# Patient Record
Sex: FEMALE | Race: WHITE | Employment: FULL TIME | URBAN - METROPOLITAN AREA
[De-identification: names, ages, dates, MRNs, and addresses within clinical notes are randomized per-mention and may not be internally consistent; named-entity substitution may affect disease eponyms.]

---

## 2017-02-28 ENCOUNTER — HOSPITAL ENCOUNTER (OUTPATIENT)
Dept: SURGERY | Age: 52
Discharge: HOME | End: 2017-02-28
Payer: COMMERCIAL

## 2017-02-28 VITALS
BODY MASS INDEX: 41.41 KG/M2 | TEMPERATURE: 98.2 F | WEIGHT: 225 LBS | OXYGEN SATURATION: 96 % | RESPIRATION RATE: 16 BRPM | DIASTOLIC BLOOD PRESSURE: 79 MMHG | SYSTOLIC BLOOD PRESSURE: 167 MMHG | HEART RATE: 80 BPM | HEIGHT: 62 IN

## 2017-02-28 LAB
BACTERIA SPEC CULT: NORMAL
SERVICE CMNT-IMP: NORMAL

## 2017-02-28 PROCEDURE — 87641 MR-STAPH DNA AMP PROBE: CPT | Performed by: ORTHOPAEDIC SURGERY

## 2017-02-28 NOTE — PERIOP NOTES
Patient verified name, , and surgery as listed in [facility]. TYPE  CASE:3              Orders:not yet received. Chart marked for lab results to be obtained from Dr Dennis Mcgee. MRSA MSSA nasal swab obtained and sent to lab per verbal.    Labs per anesthesia protocol: type and screen DOS  EKG  :  16  Glucose: Not required    Pt takes no Rx medication. Instructed patient to continue previous medications as prescribed prior to surgery and  to take the following medications the day of surgery according to anesthesia guidelines with a small sip of water : none       Continue all previous medications unless otherwise directed. Instructed patient to hold  the following medications prior to surgery: aleve and Mobic    Patient instructed on the following and verbalized understanding:  Arrive at MAIN entrance, time of arrival to be called the day before by 1700. Responsible adult must drive patient to and from hospital, stay during surgery and 24 hours postoperatively. Npo after midnight including gum, mints and ice chips. Use hibiclens in the shower the night before and the morning of surgery. Leave all valuables at home. Instructed on no jewelry or body piercings on the dos. Bring insurance card and ID. No perfumes, oil, powder, colognes, makeup or  lotions on the skin. Patient verbalized understanding of all instructions and provided all medical/health information to the best of their ability.

## 2017-03-01 NOTE — PERIOP NOTES
Received faxed labs dated 2/28/17 by Dr. Shore Cuff office to include CBC, BMP, PT, PTT. Placed on chart.

## 2017-03-03 ENCOUNTER — ANESTHESIA EVENT (OUTPATIENT)
Dept: SURGERY | Age: 52
DRG: 470 | End: 2017-03-03
Payer: COMMERCIAL

## 2017-03-05 PROBLEM — K02.9 DENTAL CARIES: Chronic | Status: ACTIVE | Noted: 2017-03-05

## 2017-03-05 PROBLEM — M17.11 PRIMARY OSTEOARTHRITIS OF RIGHT KNEE: Status: ACTIVE | Noted: 2017-03-05

## 2017-03-05 NOTE — ANESTHESIA PREPROCEDURE EVALUATION
Anesthetic History   No history of anesthetic complications            Review of Systems / Medical History  Patient summary reviewed and pertinent labs reviewed    Pulmonary  Within defined limits                 Neuro/Psych   Within defined limits           Cardiovascular  Within defined limits                Exercise tolerance: >4 METS     GI/Hepatic/Renal  Within defined limits              Endo/Other        Morbid obesity and arthritis     Other Findings            Physical Exam    Airway  Mallampati: II  TM Distance: 4 - 6 cm  Neck ROM: normal range of motion   Mouth opening: Normal     Cardiovascular  Regular rate and rhythm,  S1 and S2 normal,  no murmur, click, rub, or gallop             Dental    Dentition: Edentulous     Pulmonary  Breath sounds clear to auscultation               Abdominal  GI exam deferred       Other Findings            Anesthetic Plan    ASA: 3  Anesthesia type: spinal      Post-op pain plan if not by surgeon: peripheral nerve block single      Anesthetic plan and risks discussed with: Patient and Family

## 2017-03-06 ENCOUNTER — HOSPITAL ENCOUNTER (INPATIENT)
Age: 52
LOS: 1 days | Discharge: HOME | DRG: 470 | End: 2017-03-07
Attending: ORTHOPAEDIC SURGERY | Admitting: ORTHOPAEDIC SURGERY
Payer: COMMERCIAL

## 2017-03-06 ENCOUNTER — ANESTHESIA (OUTPATIENT)
Dept: SURGERY | Age: 52
DRG: 470 | End: 2017-03-06
Payer: COMMERCIAL

## 2017-03-06 PROBLEM — M17.11 RIGHT KNEE DJD: Status: ACTIVE | Noted: 2017-03-06

## 2017-03-06 LAB
ABO + RH BLD: NORMAL
ANION GAP BLD CALC-SCNC: 12 MMOL/L (ref 7–16)
APPEARANCE UR: ABNORMAL
APTT PPP: 21 SEC (ref 23.5–31.7)
ATRIAL RATE: 90 BPM
BACTERIA URNS QL MICRO: 0 /HPF
BASOPHILS # BLD AUTO: 0 K/UL (ref 0–0.2)
BASOPHILS # BLD: 0 % (ref 0–2)
BILIRUB UR QL: NEGATIVE
BLOOD GROUP ANTIBODIES SERPL: NORMAL
BUN SERPL-MCNC: 10 MG/DL (ref 6–23)
CALCIUM SERPL-MCNC: 8.5 MG/DL (ref 8.3–10.4)
CALCULATED P AXIS, ECG09: 38 DEGREES
CALCULATED R AXIS, ECG10: 21 DEGREES
CALCULATED T AXIS, ECG11: 50 DEGREES
CASTS URNS QL MICRO: ABNORMAL /LPF
CHLORIDE SERPL-SCNC: 105 MMOL/L (ref 98–107)
CO2 SERPL-SCNC: 23 MMOL/L (ref 21–32)
COLOR UR: YELLOW
CREAT SERPL-MCNC: 0.97 MG/DL (ref 0.6–1)
DIAGNOSIS, 93000: NORMAL
DIASTOLIC BP, ECG02: NORMAL MMHG
DIFFERENTIAL METHOD BLD: ABNORMAL
EOSINOPHIL # BLD: 0 K/UL (ref 0–0.8)
EOSINOPHIL NFR BLD: 0 % (ref 0.5–7.8)
EPI CELLS #/AREA URNS HPF: ABNORMAL /HPF
ERYTHROCYTE [DISTWIDTH] IN BLOOD BY AUTOMATED COUNT: 14.9 % (ref 11.9–14.6)
GLUCOSE SERPL-MCNC: 147 MG/DL (ref 65–100)
GLUCOSE UR STRIP.AUTO-MCNC: NEGATIVE MG/DL
HCG UR QL: NEGATIVE
HCT VFR BLD AUTO: 35.8 % (ref 35.8–46.3)
HGB BLD-MCNC: 11.3 G/DL (ref 11.7–15.4)
HGB UR QL STRIP: NEGATIVE
IMM GRANULOCYTES # BLD: 0 K/UL (ref 0–0.5)
IMM GRANULOCYTES NFR BLD AUTO: 0.1 % (ref 0–5)
INR PPP: 1 (ref 0.9–1.2)
KETONES UR QL STRIP.AUTO: NEGATIVE MG/DL
LEUKOCYTE ESTERASE UR QL STRIP.AUTO: ABNORMAL
LYMPHOCYTES # BLD AUTO: 6 % (ref 13–44)
LYMPHOCYTES # BLD: 0.5 K/UL (ref 0.5–4.6)
MCH RBC QN AUTO: 25.3 PG (ref 26.1–32.9)
MCHC RBC AUTO-ENTMCNC: 31.6 G/DL (ref 31.4–35)
MCV RBC AUTO: 80.1 FL (ref 79.6–97.8)
MONOCYTES # BLD: 0 K/UL (ref 0.1–1.3)
MONOCYTES NFR BLD AUTO: 0 % (ref 4–12)
NEUTS SEG # BLD: 8.1 K/UL (ref 1.7–8.2)
NEUTS SEG NFR BLD AUTO: 94 % (ref 43–78)
NITRITE UR QL STRIP.AUTO: NEGATIVE
P-R INTERVAL, ECG05: 152 MS
PH UR STRIP: 6 [PH] (ref 5–9)
PLATELET # BLD AUTO: 287 K/UL (ref 150–450)
PMV BLD AUTO: 9.8 FL (ref 10.8–14.1)
POTASSIUM SERPL-SCNC: 3.9 MMOL/L (ref 3.5–5.1)
PROT UR STRIP-MCNC: NEGATIVE MG/DL
PROTHROMBIN TIME: 10.5 SEC (ref 9.6–12)
Q-T INTERVAL, ECG07: 368 MS
QRS DURATION, ECG06: 88 MS
QTC CALCULATION (BEZET), ECG08: 450 MS
RBC # BLD AUTO: 4.47 M/UL (ref 4.05–5.25)
RBC #/AREA URNS HPF: ABNORMAL /HPF
SODIUM SERPL-SCNC: 140 MMOL/L (ref 136–145)
SP GR UR REFRACTOMETRY: 1.01 (ref 1–1.02)
SPECIMEN EXP DATE BLD: NORMAL
SYSTOLIC BP, ECG01: NORMAL MMHG
UROBILINOGEN UR QL STRIP.AUTO: 0.2 EU/DL (ref 0.2–1)
VENTRICULAR RATE, ECG03: 90 BPM
WBC # BLD AUTO: 8.6 K/UL (ref 4.3–11.1)
WBC URNS QL MICRO: ABNORMAL /HPF

## 2017-03-06 PROCEDURE — 76060000035 HC ANESTHESIA 2 TO 2.5 HR: Performed by: ORTHOPAEDIC SURGERY

## 2017-03-06 PROCEDURE — 80048 BASIC METABOLIC PNL TOTAL CA: CPT | Performed by: ORTHOPAEDIC SURGERY

## 2017-03-06 PROCEDURE — 81025 URINE PREGNANCY TEST: CPT

## 2017-03-06 PROCEDURE — 74011000258 HC RX REV CODE- 258: Performed by: ORTHOPAEDIC SURGERY

## 2017-03-06 PROCEDURE — 74011250636 HC RX REV CODE- 250/636: Performed by: ORTHOPAEDIC SURGERY

## 2017-03-06 PROCEDURE — 76010000171 HC OR TIME 2 TO 2.5 HR INTENSV-TIER 1: Performed by: ORTHOPAEDIC SURGERY

## 2017-03-06 PROCEDURE — 74011000250 HC RX REV CODE- 250: Performed by: ORTHOPAEDIC SURGERY

## 2017-03-06 PROCEDURE — 97161 PT EVAL LOW COMPLEX 20 MIN: CPT

## 2017-03-06 PROCEDURE — 77030006835 HC BLD SAW SAG STRY -B: Performed by: ORTHOPAEDIC SURGERY

## 2017-03-06 PROCEDURE — 85610 PROTHROMBIN TIME: CPT | Performed by: ORTHOPAEDIC SURGERY

## 2017-03-06 PROCEDURE — 77030016547 HC BLD SAW SAG1 STRY -B: Performed by: ORTHOPAEDIC SURGERY

## 2017-03-06 PROCEDURE — 74011250637 HC RX REV CODE- 250/637: Performed by: ANESTHESIOLOGY

## 2017-03-06 PROCEDURE — 77030003665 HC NDL SPN BBMI -A: Performed by: ANESTHESIOLOGY

## 2017-03-06 PROCEDURE — 36415 COLL VENOUS BLD VENIPUNCTURE: CPT | Performed by: ORTHOPAEDIC SURGERY

## 2017-03-06 PROCEDURE — C1713 ANCHOR/SCREW BN/BN,TIS/BN: HCPCS | Performed by: ORTHOPAEDIC SURGERY

## 2017-03-06 PROCEDURE — 74011250637 HC RX REV CODE- 250/637: Performed by: ORTHOPAEDIC SURGERY

## 2017-03-06 PROCEDURE — C1776 JOINT DEVICE (IMPLANTABLE): HCPCS | Performed by: ORTHOPAEDIC SURGERY

## 2017-03-06 PROCEDURE — 81001 URINALYSIS AUTO W/SCOPE: CPT | Performed by: ORTHOPAEDIC SURGERY

## 2017-03-06 PROCEDURE — 85730 THROMBOPLASTIN TIME PARTIAL: CPT | Performed by: ORTHOPAEDIC SURGERY

## 2017-03-06 PROCEDURE — 77030013727 HC IRR FAN PULSVC ZIMM -B: Performed by: ORTHOPAEDIC SURGERY

## 2017-03-06 PROCEDURE — 74011250636 HC RX REV CODE- 250/636

## 2017-03-06 PROCEDURE — 76010010054 HC POST OP PAIN BLOCK: Performed by: ORTHOPAEDIC SURGERY

## 2017-03-06 PROCEDURE — 77030007880 HC KT SPN EPDRL BBMI -B: Performed by: ANESTHESIOLOGY

## 2017-03-06 PROCEDURE — 77030008467 HC STPLR SKN COVD -B: Performed by: ORTHOPAEDIC SURGERY

## 2017-03-06 PROCEDURE — 77030031139 HC SUT VCRL2 J&J -A: Performed by: ORTHOPAEDIC SURGERY

## 2017-03-06 PROCEDURE — 77030003666 HC NDL SPINAL BD -A: Performed by: ORTHOPAEDIC SURGERY

## 2017-03-06 PROCEDURE — 76942 ECHO GUIDE FOR BIOPSY: CPT | Performed by: ORTHOPAEDIC SURGERY

## 2017-03-06 PROCEDURE — 77030006812 HC BLD SAW RECIP STRY -B: Performed by: ORTHOPAEDIC SURGERY

## 2017-03-06 PROCEDURE — 93005 ELECTROCARDIOGRAM TRACING: CPT | Performed by: ORTHOPAEDIC SURGERY

## 2017-03-06 PROCEDURE — 77030020753 HC CUF TRNQT 1BLA STRY -B: Performed by: ORTHOPAEDIC SURGERY

## 2017-03-06 PROCEDURE — 77030012894: Performed by: ORTHOPAEDIC SURGERY

## 2017-03-06 PROCEDURE — 86900 BLOOD TYPING SEROLOGIC ABO: CPT | Performed by: ANESTHESIOLOGY

## 2017-03-06 PROCEDURE — 77030020782 HC GWN BAIR PAWS FLX 3M -B: Performed by: ANESTHESIOLOGY

## 2017-03-06 PROCEDURE — 77030003602 HC NDL NRV BLK BBMI -B: Performed by: ANESTHESIOLOGY

## 2017-03-06 PROCEDURE — 74011250636 HC RX REV CODE- 250/636: Performed by: ANESTHESIOLOGY

## 2017-03-06 PROCEDURE — 77030011640 HC PAD GRND REM COVD -A: Performed by: ORTHOPAEDIC SURGERY

## 2017-03-06 PROCEDURE — 85025 COMPLETE CBC W/AUTO DIFF WBC: CPT | Performed by: ORTHOPAEDIC SURGERY

## 2017-03-06 PROCEDURE — 77030018836 HC SOL IRR NACL ICUM -A: Performed by: ORTHOPAEDIC SURGERY

## 2017-03-06 PROCEDURE — 0SRC0J9 REPLACEMENT OF RIGHT KNEE JOINT WITH SYNTHETIC SUBSTITUTE, CEMENTED, OPEN APPROACH: ICD-10-PCS | Performed by: ORTHOPAEDIC SURGERY

## 2017-03-06 PROCEDURE — 74011000250 HC RX REV CODE- 250

## 2017-03-06 PROCEDURE — 76210000006 HC OR PH I REC 0.5 TO 1 HR: Performed by: ORTHOPAEDIC SURGERY

## 2017-03-06 PROCEDURE — 65270000029 HC RM PRIVATE

## 2017-03-06 DEVICE — IMPLANTABLE DEVICE: Type: IMPLANTABLE DEVICE | Site: KNEE | Status: FUNCTIONAL

## 2017-03-06 DEVICE — (D)CEMENT BNE HV R 40GM -- DUPE USE ITEM 353850: Type: IMPLANTABLE DEVICE | Site: KNEE | Status: FUNCTIONAL

## 2017-03-06 DEVICE — COMPNT FEM TWIN PEG MED --: Type: IMPLANTABLE DEVICE | Site: KNEE | Status: FUNCTIONAL

## 2017-03-06 RX ORDER — NALOXONE HYDROCHLORIDE 0.4 MG/ML
.2-.4 INJECTION, SOLUTION INTRAMUSCULAR; INTRAVENOUS; SUBCUTANEOUS
Status: DISCONTINUED | OUTPATIENT
Start: 2017-03-06 | End: 2017-03-07 | Stop reason: HOSPADM

## 2017-03-06 RX ORDER — AMOXICILLIN 250 MG
2 CAPSULE ORAL DAILY
Status: DISCONTINUED | OUTPATIENT
Start: 2017-03-07 | End: 2017-03-07 | Stop reason: HOSPADM

## 2017-03-06 RX ORDER — DIPHENHYDRAMINE HCL 25 MG
25 CAPSULE ORAL
Status: DISCONTINUED | OUTPATIENT
Start: 2017-03-06 | End: 2017-03-07 | Stop reason: HOSPADM

## 2017-03-06 RX ORDER — FENTANYL CITRATE 50 UG/ML
100 INJECTION, SOLUTION INTRAMUSCULAR; INTRAVENOUS ONCE
Status: COMPLETED | OUTPATIENT
Start: 2017-03-06 | End: 2017-03-06

## 2017-03-06 RX ORDER — HYDROCODONE BITARTRATE AND ACETAMINOPHEN 5; 325 MG/1; MG/1
2 TABLET ORAL AS NEEDED
Status: DISCONTINUED | OUTPATIENT
Start: 2017-03-06 | End: 2017-03-06 | Stop reason: HOSPADM

## 2017-03-06 RX ORDER — KETOROLAC TROMETHAMINE 30 MG/ML
INJECTION, SOLUTION INTRAMUSCULAR; INTRAVENOUS AS NEEDED
Status: DISCONTINUED | OUTPATIENT
Start: 2017-03-06 | End: 2017-03-06 | Stop reason: HOSPADM

## 2017-03-06 RX ORDER — SODIUM CHLORIDE 9 MG/ML
100 INJECTION, SOLUTION INTRAVENOUS CONTINUOUS
Status: DISCONTINUED | OUTPATIENT
Start: 2017-03-06 | End: 2017-03-07 | Stop reason: HOSPADM

## 2017-03-06 RX ORDER — SODIUM CHLORIDE, SODIUM LACTATE, POTASSIUM CHLORIDE, CALCIUM CHLORIDE 600; 310; 30; 20 MG/100ML; MG/100ML; MG/100ML; MG/100ML
75 INJECTION, SOLUTION INTRAVENOUS CONTINUOUS
Status: DISCONTINUED | OUTPATIENT
Start: 2017-03-06 | End: 2017-03-06 | Stop reason: HOSPADM

## 2017-03-06 RX ORDER — MIDAZOLAM HYDROCHLORIDE 1 MG/ML
2 INJECTION, SOLUTION INTRAMUSCULAR; INTRAVENOUS
Status: DISCONTINUED | OUTPATIENT
Start: 2017-03-06 | End: 2017-03-06 | Stop reason: HOSPADM

## 2017-03-06 RX ORDER — HYDROMORPHONE HYDROCHLORIDE 2 MG/ML
0.5 INJECTION, SOLUTION INTRAMUSCULAR; INTRAVENOUS; SUBCUTANEOUS
Status: DISCONTINUED | OUTPATIENT
Start: 2017-03-06 | End: 2017-03-06 | Stop reason: HOSPADM

## 2017-03-06 RX ORDER — FENTANYL CITRATE 50 UG/ML
INJECTION, SOLUTION INTRAMUSCULAR; INTRAVENOUS AS NEEDED
Status: DISCONTINUED | OUTPATIENT
Start: 2017-03-06 | End: 2017-03-06 | Stop reason: HOSPADM

## 2017-03-06 RX ORDER — ENOXAPARIN SODIUM 100 MG/ML
40 INJECTION SUBCUTANEOUS EVERY 24 HOURS
Status: DISCONTINUED | OUTPATIENT
Start: 2017-03-07 | End: 2017-03-07 | Stop reason: HOSPADM

## 2017-03-06 RX ORDER — DEXAMETHASONE SODIUM PHOSPHATE 4 MG/ML
INJECTION, SOLUTION INTRA-ARTICULAR; INTRALESIONAL; INTRAMUSCULAR; INTRAVENOUS; SOFT TISSUE AS NEEDED
Status: DISCONTINUED | OUTPATIENT
Start: 2017-03-06 | End: 2017-03-06 | Stop reason: HOSPADM

## 2017-03-06 RX ORDER — LIDOCAINE HYDROCHLORIDE 10 MG/ML
0.1 INJECTION INFILTRATION; PERINEURAL AS NEEDED
Status: DISCONTINUED | OUTPATIENT
Start: 2017-03-06 | End: 2017-03-06 | Stop reason: HOSPADM

## 2017-03-06 RX ORDER — CEFAZOLIN SODIUM IN 0.9 % NACL 2 G/50 ML
2 INTRAVENOUS SOLUTION, PIGGYBACK (ML) INTRAVENOUS EVERY 8 HOURS
Status: COMPLETED | OUTPATIENT
Start: 2017-03-06 | End: 2017-03-07

## 2017-03-06 RX ORDER — CEFAZOLIN SODIUM IN 0.9 % NACL 2 G/50 ML
2 INTRAVENOUS SOLUTION, PIGGYBACK (ML) INTRAVENOUS ONCE
Status: COMPLETED | OUTPATIENT
Start: 2017-03-06 | End: 2017-03-06

## 2017-03-06 RX ORDER — MIDAZOLAM HYDROCHLORIDE 1 MG/ML
INJECTION, SOLUTION INTRAMUSCULAR; INTRAVENOUS AS NEEDED
Status: DISCONTINUED | OUTPATIENT
Start: 2017-03-06 | End: 2017-03-06 | Stop reason: HOSPADM

## 2017-03-06 RX ORDER — ASPIRIN 325 MG
325 TABLET, DELAYED RELEASE (ENTERIC COATED) ORAL EVERY 12 HOURS
Status: DISCONTINUED | OUTPATIENT
Start: 2017-03-06 | End: 2017-03-07 | Stop reason: HOSPADM

## 2017-03-06 RX ORDER — HYDROCODONE BITARTRATE AND ACETAMINOPHEN 5; 325 MG/1; MG/1
1 TABLET ORAL
Status: DISCONTINUED | OUTPATIENT
Start: 2017-03-06 | End: 2017-03-07 | Stop reason: HOSPADM

## 2017-03-06 RX ORDER — DEXAMETHASONE SODIUM PHOSPHATE 100 MG/10ML
10 INJECTION INTRAMUSCULAR; INTRAVENOUS ONCE
Status: DISCONTINUED | OUTPATIENT
Start: 2017-03-07 | End: 2017-03-07 | Stop reason: HOSPADM

## 2017-03-06 RX ORDER — SODIUM CHLORIDE 0.9 % (FLUSH) 0.9 %
5-10 SYRINGE (ML) INJECTION EVERY 8 HOURS
Status: DISCONTINUED | OUTPATIENT
Start: 2017-03-06 | End: 2017-03-07 | Stop reason: HOSPADM

## 2017-03-06 RX ORDER — BUPIVACAINE HYDROCHLORIDE AND EPINEPHRINE 5; 5 MG/ML; UG/ML
INJECTION, SOLUTION EPIDURAL; INTRACAUDAL; PERINEURAL AS NEEDED
Status: DISCONTINUED | OUTPATIENT
Start: 2017-03-06 | End: 2017-03-06 | Stop reason: HOSPADM

## 2017-03-06 RX ORDER — CELECOXIB 200 MG/1
200 CAPSULE ORAL EVERY 12 HOURS
Status: DISCONTINUED | OUTPATIENT
Start: 2017-03-06 | End: 2017-03-07 | Stop reason: HOSPADM

## 2017-03-06 RX ORDER — HYDROMORPHONE HYDROCHLORIDE 1 MG/ML
1 INJECTION, SOLUTION INTRAMUSCULAR; INTRAVENOUS; SUBCUTANEOUS
Status: DISCONTINUED | OUTPATIENT
Start: 2017-03-06 | End: 2017-03-07 | Stop reason: HOSPADM

## 2017-03-06 RX ORDER — OXYCODONE AND ACETAMINOPHEN 7.5; 325 MG/1; MG/1
1 TABLET ORAL
Status: DISCONTINUED | OUTPATIENT
Start: 2017-03-06 | End: 2017-03-07 | Stop reason: HOSPADM

## 2017-03-06 RX ORDER — FAMOTIDINE 20 MG/1
20 TABLET, FILM COATED ORAL ONCE
Status: COMPLETED | OUTPATIENT
Start: 2017-03-06 | End: 2017-03-06

## 2017-03-06 RX ORDER — ONDANSETRON 2 MG/ML
INJECTION INTRAMUSCULAR; INTRAVENOUS AS NEEDED
Status: DISCONTINUED | OUTPATIENT
Start: 2017-03-06 | End: 2017-03-06 | Stop reason: HOSPADM

## 2017-03-06 RX ORDER — MIDAZOLAM HYDROCHLORIDE 1 MG/ML
5 INJECTION, SOLUTION INTRAMUSCULAR; INTRAVENOUS ONCE
Status: COMPLETED | OUTPATIENT
Start: 2017-03-06 | End: 2017-03-06

## 2017-03-06 RX ORDER — OXYCODONE HYDROCHLORIDE 5 MG/1
5 TABLET ORAL
Status: DISCONTINUED | OUTPATIENT
Start: 2017-03-06 | End: 2017-03-06 | Stop reason: HOSPADM

## 2017-03-06 RX ORDER — MORPHINE SULFATE 10 MG/ML
INJECTION, SOLUTION INTRAMUSCULAR; INTRAVENOUS AS NEEDED
Status: DISCONTINUED | OUTPATIENT
Start: 2017-03-06 | End: 2017-03-06 | Stop reason: HOSPADM

## 2017-03-06 RX ORDER — SODIUM CHLORIDE 0.9 % (FLUSH) 0.9 %
5-10 SYRINGE (ML) INJECTION AS NEEDED
Status: DISCONTINUED | OUTPATIENT
Start: 2017-03-06 | End: 2017-03-07 | Stop reason: HOSPADM

## 2017-03-06 RX ORDER — PROPOFOL 10 MG/ML
INJECTION, EMULSION INTRAVENOUS
Status: DISCONTINUED | OUTPATIENT
Start: 2017-03-06 | End: 2017-03-06 | Stop reason: HOSPADM

## 2017-03-06 RX ORDER — BUPIVACAINE HYDROCHLORIDE 7.5 MG/ML
INJECTION, SOLUTION INTRASPINAL AS NEEDED
Status: DISCONTINUED | OUTPATIENT
Start: 2017-03-06 | End: 2017-03-06 | Stop reason: HOSPADM

## 2017-03-06 RX ADMIN — SODIUM CHLORIDE 100 ML/HR: 900 INJECTION, SOLUTION INTRAVENOUS at 11:18

## 2017-03-06 RX ADMIN — PROPOFOL 160 MCG/KG/MIN: 10 INJECTION, EMULSION INTRAVENOUS at 07:50

## 2017-03-06 RX ADMIN — HYDROMORPHONE HYDROCHLORIDE 1 MG: 1 INJECTION, SOLUTION INTRAMUSCULAR; INTRAVENOUS; SUBCUTANEOUS at 12:27

## 2017-03-06 RX ADMIN — FENTANYL CITRATE 50 MCG: 50 INJECTION, SOLUTION INTRAMUSCULAR; INTRAVENOUS at 06:55

## 2017-03-06 RX ADMIN — FENTANYL CITRATE 50 MCG: 50 INJECTION, SOLUTION INTRAMUSCULAR; INTRAVENOUS at 07:42

## 2017-03-06 RX ADMIN — SODIUM CHLORIDE, SODIUM LACTATE, POTASSIUM CHLORIDE, AND CALCIUM CHLORIDE 75 ML/HR: 600; 310; 30; 20 INJECTION, SOLUTION INTRAVENOUS at 06:28

## 2017-03-06 RX ADMIN — DEXAMETHASONE SODIUM PHOSPHATE 4 MG: 4 INJECTION, SOLUTION INTRA-ARTICULAR; INTRALESIONAL; INTRAMUSCULAR; INTRAVENOUS; SOFT TISSUE at 08:29

## 2017-03-06 RX ADMIN — ASPIRIN 325 MG: 325 TABLET, DELAYED RELEASE ORAL at 20:44

## 2017-03-06 RX ADMIN — OXYCODONE HYDROCHLORIDE AND ACETAMINOPHEN 1 TABLET: 7.5; 325 TABLET ORAL at 11:18

## 2017-03-06 RX ADMIN — MIDAZOLAM HYDROCHLORIDE 2 MG: 1 INJECTION, SOLUTION INTRAMUSCULAR; INTRAVENOUS at 07:41

## 2017-03-06 RX ADMIN — FAMOTIDINE 20 MG: 20 TABLET ORAL at 06:29

## 2017-03-06 RX ADMIN — BUPIVACAINE HYDROCHLORIDE 1.7 ML: 7.5 INJECTION, SOLUTION INTRASPINAL at 07:47

## 2017-03-06 RX ADMIN — CELECOXIB 200 MG: 200 CAPSULE ORAL at 11:18

## 2017-03-06 RX ADMIN — OXYCODONE HYDROCHLORIDE AND ACETAMINOPHEN 1 TABLET: 7.5; 325 TABLET ORAL at 23:11

## 2017-03-06 RX ADMIN — TRANEXAMIC ACID 1 G: 100 INJECTION, SOLUTION INTRAVENOUS at 08:05

## 2017-03-06 RX ADMIN — ONDANSETRON 4 MG: 2 INJECTION INTRAMUSCULAR; INTRAVENOUS at 08:30

## 2017-03-06 RX ADMIN — MIDAZOLAM 3 MG: 1 INJECTION INTRAMUSCULAR; INTRAVENOUS at 06:55

## 2017-03-06 RX ADMIN — FENTANYL CITRATE 50 MCG: 50 INJECTION, SOLUTION INTRAMUSCULAR; INTRAVENOUS at 08:50

## 2017-03-06 RX ADMIN — CEFAZOLIN 2 G: 1 INJECTION, POWDER, FOR SOLUTION INTRAMUSCULAR; INTRAVENOUS; PARENTERAL at 16:00

## 2017-03-06 RX ADMIN — OXYCODONE HYDROCHLORIDE AND ACETAMINOPHEN 1 TABLET: 7.5; 325 TABLET ORAL at 16:00

## 2017-03-06 RX ADMIN — HYDROMORPHONE HYDROCHLORIDE 1 MG: 1 INJECTION, SOLUTION INTRAMUSCULAR; INTRAVENOUS; SUBCUTANEOUS at 19:18

## 2017-03-06 RX ADMIN — CEFAZOLIN 2 G: 1 INJECTION, POWDER, FOR SOLUTION INTRAMUSCULAR; INTRAVENOUS; PARENTERAL at 07:47

## 2017-03-06 RX ADMIN — CELECOXIB 200 MG: 200 CAPSULE ORAL at 23:11

## 2017-03-06 NOTE — PROGRESS NOTES
Problem: Mobility Impaired (Adult and Pediatric)  Goal: *Acute Goals and Plan of Care (Insert Text)  LTG:  (1.)Ms. Lucas will move from supine to sit and sit to supine , scoot up and down and roll side to side in bed with INDEPENDENCE within 5 day(s). (2.)Ms. Lucas will transfer from bed to chair and chair to bed with MODIFIED INDEPENDENCE using the least restrictive device within 5 day(s). (3.)Ms. Lucas will ambulate with MODIFIED INDEPENDENCE for 150+ feet with the least restrictive device within 5 day(s). (4.)Ms. Lucas will participate in therapeutic activity/exerices x 20 for increased strength within 5 days. (5.)Ms. Lucas will have 0 degrees R knee extension PROM to increase functional mobility and normalize gait pattern within 5 days. (6.)Ms. Lucas will have 110 degrees R knee flexion AROM to increase functional mobility and transfers within 5 days. ________________________________________________________________________________________________      PHYSICAL THERAPY: INITIAL ASSESSMENT, PM 3/6/2017  INPATIENT: Hospital Day: 1  Payor: "[employer]" / [place]UE ESSENTIALS COLLIN / Product Type: COLLIN /    R LE WBAT     NAME/AGE/GENDER: Ahsan Yoon is a 46 y.o. female             PRIMARY DIAGNOSIS: Primary osteoarthritis of right knee [M17.11] Primary osteoarthritis of right knee Primary osteoarthritis of right knee  Procedure(s) (LRB):  PARTIAL OXFORD  (Right)  Day of Surgery  ICD-10: Treatment Diagnosis:       · Generalized Muscle Weakness (M62.81)  · Other abnormalities of gait and mobility (R26.89)   Precaution/Allergies:  Pcn [penicillins]       ASSESSMENT:      Ms. Jane Lemons is a 46 y.o. female s/p above performed by Dr. Dangelo Lawson who is WBAT on R LE. Pt presents to PT with generalized weakness and decreased ROM in R knee. Ms. Jane Lemons reoprts intact sensation to light touch in B L5-S2 dermatomes. Pt was able to perform bed mobility with supervision and has good sitting balance.   Pt was instructed on transfer training with RW and was able to perform STS with CGA. Pt observed to have at least 90 degrees R knee flexion AROM but is lack full R knee extension. Pt has good to fair standing balance and was able to ambulate with CGA/RW and emerging reciprocal gait. She has decreased R weight bearing and L step length during gait. Ms. Tray Jernigan transferred to toilet and bedside chair with CGA. Pt instructed on R knee therapeutic exercise and provided HEP for strengthening and ROM. Pt left reclined in bedside chair with pillow under ankle and ice pack on anterior R knee to promote terminal knee extension. Pt could benefit from skilled PT to address above deficits and help pt return to baseline. This section established at most recent assessment   PROBLEM LIST (Impairments causing functional limitations):  1. Decreased Strength  2. Decreased Transfer Abilities  3. Decreased Ambulation Ability/Technique  4. Decreased Balance  5. Decreased Flexibility/Joint Mobility    INTERVENTIONS PLANNED: (Benefits and precautions of physical therapy have been discussed with the patient.)  1. Balance Exercise  2. Bed Mobility  3. Family Education  4. Gait Training  5. Neuromuscular Re-education/Strengthening  6. Range of Motion (ROM)  7. Therapeutic Activites  8. Therapeutic Exercise/Strengthening  9. Transfer Training  10. Group Therapy      TREATMENT PLAN: Frequency/Duration: twice daily for duration of hospital stay  Rehabilitation Potential For Stated Goals: GOOD      RECOMMENDED REHABILITATION/EQUIPMENT: (at time of discharge pending progress): Continue Skilled Therapy. HISTORY:   History of Present Injury/Illness (Reason for Referral):  S/P PARTIAL OXFORD  (Right)  Past Medical History/Comorbidities:   Ms. Tray Jernigan  has a past medical history of Arthritis; Diverticulitis; Morbid obesity ([address] and Right knee pain.   Ms. Tray Jernigan  has a past surgical history that includes abdomen surgery proc unlisted () and  section (). Social History/Living Environment:   Home Environment: Trailer/mobile home  # Steps to Enter: 1  One/Two Story Residence: One story  Living Alone: No  Support Systems: Spouse/Significant Other/Partner  Patient Expects to be Discharged to[de-identified] Unknown  Current DME Used/Available at Home: Walker, rolling, Shower chair, Commode, bedside, [address], [address], quad, Cane, straight  Tub or Shower Type: Tub/Shower combination  Prior Level of Function/Work/Activity:  Pt reports she lives in a mobile home with her spouse that has one step to enter. She reports being independent with ADLs and ambulation prior to surgery with no recent falls. Number of Personal Factors/Comorbidities that affect the Plan of Care:  · Obesity 1-2: MODERATE COMPLEXITY   EXAMINATION:   Most Recent Physical Functioning:   Gross Assessment:  AROM: Generally decreased, functional (R knee)  Strength: Generally decreased, functional (R knee)  Sensation: Intact               Posture:     Balance:  Sitting: Intact  Standing: Impaired  Standing - Static: Good  Standing - Dynamic : Fair Bed Mobility:  Supine to Sit: Supervision  Scooting: Supervision  Wheelchair Mobility:     Transfers:  Sit to Stand: Contact guard assistance  Stand to Sit: Contact guard assistance  Bed to Chair: Contact guard assistance  Interventions: Safety awareness training; Tactile cues; Verbal cues; Visual cues  Gait:     Base of Support: Shift to left  Speed/Elida: Slow  Step Length: Left shortened  Stance: Right decreased  Gait Abnormalities: Antalgic  Distance (ft): 5 Feet (ft)  Assistive Device: Walker, rolling  Ambulation - Level of Assistance: Contact guard assistance       Body Structures Involved:  1. Bones  2. Joints  3. Muscles Body Functions Affected:  1. Sensory/Pain  2. Neuromusculoskeletal  3. Movement Related Activities and Participation Affected:  1. General Tasks and Demands  2. Mobility  3.  Community, Social and [place]   Number of elements that affect the Plan of Care: 4+: HIGH COMPLEXITY   CLINICAL PRESENTATION:   Presentation: Stable and uncomplicated: LOW COMPLEXITY   CLINICAL DECISION MAKIN [facility] [address] [address] AM-PAC 6 Clicks   Basic Mobility Inpatient Short Form  How much difficulty does the patient currently have. .. Unable A Lot A Little None   1. Turning over in bed (including adjusting bedclothes, sheets and blankets)? [ ] 1   [ ] 2   [ ] 3   [X] 4   2. Sitting down on and standing up from a chair with arms ( e.g., wheelchair, bedside commode, etc.)   [ ] 1   [ ] 2   [X] 3   [ ] 4   3. Moving from lying on back to sitting on the side of the bed? [ ] 1   [ ] 2   [ ] 3   [X] 4   How much help from another person does the patient currently need. .. Total A Lot A Little None   4. Moving to and from a bed to a chair (including a wheelchair)? [ ] 1   [ ] 2   [X] 3   [ ] 4   5. Need to walk in hospital room? [ ] 1   [ ] 2   [X] 3   [ ] 4   6. Climbing 3-5 steps with a railing? [ ] 1   [X] 2   [ ] 3   [ ] 4   © , Trustees of [address] [facility] [address] [address], under license to [employer]. All rights reserved    Score:  Initial: 19 Most Recent: X (Date: -- )     Interpretation of Tool:  Represents activities that are increasingly more difficult (i.e. Bed mobility, Transfers, Gait). Score 24 23 22-20 19-15 14-10 9-7 6       Modifier CH CI CJ CK CL CM CN         · Mobility - Walking and Moving Around:               - CURRENT STATUS:    CK - 40%-59% impaired, limited or restricted               - GOAL STATUS:           CJ - 20%-39% impaired, limited or restricted               - D/C STATUS:                       ---------------To be determined---------------  Payor[place] BLUE CROSS / [place]ENTIALS COLLIN / Product Type: COLLIN /       Medical Necessity:     · Patient demonstrates good rehab potential due to higher previous functional level.   Reason for Services/Other Comments:  · Patient continues to require skilled intervention due to decreased functional mobility, balance, and difficulty with ambulation. Use of outcome tool(s) and clinical judgement create a POC that gives a: Clear prediction of patient's progress: LOW COMPLEXITY                 TREATMENT:   (In addition to Assessment/Re-Assessment sessions the following treatments were rendered)   Pre-treatment Symptoms/Complaints:  R knee pain  Pain: Initial:   Pain Intensity 1: 4  Pain Location 1: Knee  Pain Orientation 1: Right  Post Session:  5/10      Assessment/Reassessment only, no treatment provided today     Braces/Orthotics/Lines/Etc:   · IV  · O2 Device: Room air  Treatment/Session Assessment:    · Response to Treatment:  Pt tolerated evaluation very well with only slight increase in pain. · Interdisciplinary Collaboration:  · Physical Therapist  · Registered Nurse  · After treatment position/precautions:  · Up in chair  · Bed/Chair-wheels locked  · Call light within reach  · RN notified  · Compliance with Program/Exercises: Will assess as treatment progresses. · Recommendations/Intent for next treatment session: \"Next visit will focus on advancements to more challenging activities and reduction in assistance provided\".   Total Treatment Duration:  PT Patient Time In/Time Out  Time In: 1348  Time Out: [facility] Renard Olson, PT, DPT

## 2017-03-06 NOTE — H&P
History and Physical Updated with no interval change. Marisa Lind MD History and Physical Updated with no interval change.  Marisa Lind MD

## 2017-03-06 NOTE — ANESTHESIA POSTPROCEDURE EVALUATION
Post-Anesthesia Evaluation and Assessment    Patient: Geraldo Doshi MRN: [id number]  SSN: [id number]    YOB: 1965  Age: 46 y.o. Sex: female       Cardiovascular Function/Vital Signs  Visit Vitals    /55    Pulse 61    Temp 36.6 °C (97.8 °F)    Resp 17    SpO2 94%       Patient is status post spinal anesthesia for Procedure(s):  PARTIAL OXFORD . Nausea/Vomiting: None    Postoperative hydration reviewed and adequate. Pain:  Pain Scale 1: Numeric (0 - 10) (03/06/17 1015)  Pain Intensity 1: 0 (03/06/17 1015)   Managed    Neurological Status:   Neuro (WDL): Exceptions to WDL (03/06/17 1015)  Neuro  Neurologic State: Drowsy (03/06/17 1015)  LUE Motor Response: Purposeful (03/06/17 1015)  LLE Motor Response: Other(comment) (spinal) (03/06/17 1015)  RUE Motor Response: Purposeful (03/06/17 1015)  RLE Motor Response: Other(comment) (spinal) (03/06/17 1015)   At baseline    Mental Status and Level of Consciousness: Arousable    Pulmonary Status:   O2 Device: Room air (03/06/17 1015)   Adequate oxygenation and airway patent    Complications related to anesthesia: None    Post-anesthesia assessment completed.  No concerns    Signed By: Ute Saleem MD     March 6, 2017

## 2017-03-06 NOTE — IP AVS SNAPSHOT
Current Discharge Medication List  
  
Take these medications as needed Dose & Instructions Dispensing Information Comments Morning Noon Evening Bedtime  
 oxyCODONE-acetaminophen  mg per tablet Commonly known as:  PERCOCET 10 Your next dose is: Today Notes to Patient:  At 12:35 Dose:  1 Tab Take 1 Tab by mouth every four (4) hours as needed for Pain. Max Daily Amount: 6 Tabs. Quantity:  90 Tab Refills:  0 Where to Get Your Medications Information about where to get these medications is not yet available ! Ask your nurse or doctor about these medications  
  oxyCODONE-acetaminophen  mg per tablet

## 2017-03-06 NOTE — PROGRESS NOTES
TRANSFER - IN REPORT:    Verbal report received from Wagner Wick RN(name) on [employer]  being received from [facility]) for routine post - op      Report consisted of patients Situation, Background, Assessment and   Recommendations(SBAR). Information from the following report(s) SBAR, Kardex and Intake/Output was reviewed with the receiving nurse. Opportunity for questions and clarification was provided. Assessment completed upon patients arrival to unit and care assumed.

## 2017-03-06 NOTE — ANESTHESIA PROCEDURE NOTES
Peripheral Block    Start time: 3/6/2017 6:56 AM  End time: 3/6/2017 6:59 AM  Performed by: Kendy Umaña  Authorized by: Dejan PALACIOS       Pre-procedure: Indications: at surgeon's request, post-op pain management and procedure for pain    Preanesthetic Checklist: patient identified, risks and benefits discussed, site marked, timeout performed, anesthesia consent given and patient being monitored    Timeout Time: 06:55          Block Type:   Block Type:   Adductor canal  Laterality:  Right  Monitoring:  Standard ASA monitoring, responsive to questions, continuous pulse ox, oxygen, frequent vital sign checks and heart rate  Injection Technique:  Single shot  Procedures: ultrasound guided    Patient Position: prone  Prep: chlorhexidine    Location:  Mid thigh  Needle Type:  Stimuplex  Needle Gauge:  22 G  Needle Localization:  Ultrasound guidance  Medication Injected:  0.25%  ropivacaine  Adds:  Epi 1:200K  Volume (mL):  30    Assessment:  Number of attempts:  1  Injection Assessment:  Incremental injection every 5 mL, no paresthesia, ultrasound image on chart, local visualized surrounding nerve on ultrasound, negative aspiration for blood and no intravascular symptoms  Patient tolerance:  Patient tolerated the procedure well with no immediate complications

## 2017-03-06 NOTE — H&P
Yanelis Lucas  History and physical    Subjective  Problem List:.     1. right knee pain/DJD. 2. Dental caries-all teeth extracted    3. Obesity        The patient presents today for pre-operative evaluation. She had all teeth in question extracted and is 5 weeks post extraction, cleared by dentist to proceed with knee replacement. Friends and family \"chipped in\" and paid fro her dental extractions. She continues to complain of right knee pain and has exhausted all conservative treatments. She ambulates without assistance today and is unaccompanied. Developmental history: Not recorded. Family health history: osteoarthritis (father); rheumatoid arthritis (paternal aunt). Major events: colon surgery;  . Nutrition history: Not recorded. Ongoing medical problems: none current; she reports a history of diverticulitis. Preventive care: Not recorded. Social history: She admits EtOH use - described as 12 drinks per week. She denies tobacco use. She is currently employed at [employer]. REVIEW OF SYSTEMS:.     General: Obese. Denies weight change,  change in strength or exercise tolerance. . recent tooth extraction. Head: Denies headaches,  vertigo,  injury. .     Eyes: Denies changes in vision. Ears:  Denies change in hearing. Nose: Denies epistaxis,  coryza, obstruction,  discharge. .     Mouth: All upper teeth extracted. Appear healed. (17 teeth removed)    Neck: Denies stiffness and pain. .     Breast: Denies noted lumps    Chest: Denies dyspnea, wheezing, hemoptysis, cough. .     Heart: Denies chest pains,  palpitations, syncope,  orthopnea. .     Abdomen: recent change in diet due to tooth extraction and difficulty chewing, no weight loss. Denies any other problems    : Denies urinary urgency,  dysuria, change in nature of urine. .     Gyn: Denies c/o. Musculoskeletal: right knee pain/DJD.      Psychiatric: Denies depressive symptoms, changes in sleep habits,  changes in thought content. .     Neurologic: Denies weakness, denies tremor,  seizures. Objective  Patient is a 46year old female who appears her given age and is in no apparent distress. Oriented to person, place, and time. Mood and affect are appropriate for age and situation. Assessment of respiratory effort reveals even and nonlabored respirations. .         Vital signs: Height 62 inches; Weight 225 lbs; /77 mmHg; Temp 98.1 F; Pulse 80 bpm; Oxygen Saturation 97 %. .         GEN: NAD. Lungs clear to auscultation bilaterally. Heart rate regular without murmur heard. .         Right knee x-rays (4 views, standing - CPT 50495) taken previously reveal:  severe, bone-on-bone medial joint space narrowing with osteophyte formation. Moderate patellofemoral joint space narrowing with superior osteophyte formation noted. The lateral compartment is well maintained. Right knee examination:. Inspection reveals no external signs of injury or trauma. .     No warmth or erythema noted. No palpable cords. No effusion noted. Knee alignment: varus deformity. Palpation reveals moderate tenderness over the medial joint line of the right knee. Knee extension (active): 0 degrees, with pain. Knee flexion (active): 120 degrees, with pain. Raghav Griffiths Positive for patellofemoral crepitus with knee flexion/extension. .     The right knee is stable to stressing in all planes. Neurologic: Sensation is intact and symmetrical in all dermatomes. .     Vascular: Peripheral pulses normal 2/2 lower extremities. .           Assessment    DIAGNOSIS:        Pain in right knee [ICD-10: M25.561], [ICD-9: 719.46], [SNOMED: 659767568158381]        Primary osteoarthritis of right knee [ICD-10: M17.11], [ICD-9: 715.16], [SNOMED: 234369444]        Preop examination [ICD-10: Q82.570], [ICD-9: V72.84], [SNOMED: 598471413]  Plan  We discussed her surgical options (right knee Yancey vs TKA).  We have talked about the complications of surgery, including the possibility of damage to nerves, arteries, vessels and tendons, bleeding, infection, the possibility of sustaining medical problems, even death. We have talked about the possibility that the condition may not improve after surgery  or that it could actually be worse. She seems to understand and accept these possible complications. Informed consent obtained in the office today. Preop labs today: CBC, BMP, PT INR, PTT activated. All questions answered at this time. The patient knows to contact the office with any questions or concerns. .         VERIFICATION OF ANCILLARY DOCUMENTATION:  The portions of the chart completed by ancillary personnel were reviewed by the physician. .         RTC post op. She plans to go home after discharge with help from family. She will be out of work for 12 weeks.        MEDICATIONS:        Aleve 220 MG Oral Capsule 2 PO QD in the morning                    prescription:   not prescribed this visit        Meloxicam 15 MG Oral Tablet Take 1 tablet (15 mg) by mouth daily  (start date: 10/19/2016)                    prescription:   not prescribed this visit

## 2017-03-06 NOTE — BRIEF OP NOTE
BRIEF OPERATIVE NOTE    Date of Procedure: 3/6/2017   Preoperative Diagnosis: Primary osteoarthritis of right knee [M17.11]  Postoperative Diagnosis: Primary osteoarthritis of right knee     Procedure(s):  PARTIAL OXFORD   Surgeon(s) and Role:     * Lily Abdi MD - Primary            Surgical Staff:  Circ-1: Marty Kerns RN  Scrub Tech-1: Disha Stanley  Scrub Tech-2: Viraj Barrientos  Event Time In   Incision Start 6879   Incision Close 5244     Anesthesia: Spinal   Estimated Blood Loss: minimal  Specimens: * No specimens in log *   Findings: as above with primarily medial gonarthrosis. Complications: none noted  Implants:   Implant Name Type Inv.  Item Serial No.  Lot No. LRB No. Used Action   CEMENT BNE HV R 40GM -- PALACOS - [id number]  CEMENT BNE HV R 40GM -- [employer]  [employer] [id number] Right 1 Implanted   BASEPLT TIB UNI SZ B RM/LL -- OXFORD COCRMOLY - [id number]  BASEPLT TIB UNI SZ B RM/LL -- OXFORD COCRMOLY  [employer] [id number] Right 1 Implanted   COMPNT FEM TWIN PEG MED --  - [id number]  COMPNT FEM TWIN PEG MED --   [employer] [id number] Right 1 Implanted   BEARING TIB MENIS RT MD MUSE 5 -- UNI NEO OXFORD - [id number]   BEARING TIB MENIS RT MD MUSE 5 -- UNI NEO OXFORD   [employer] [id number] Right 1 Implanted

## 2017-03-06 NOTE — IP AVS SNAPSHOT
Tessy Idol 
 
 
 [address] [facility] 
[phone] Patient: Praneeth Rubin MRN: [id number] ANNABELLE:0[birth year]4/[birth year]6 You are allergic to the following Allergen Reactions Pcn (Penicillins) Rash Recent Documentation Smoking Status Never Smoker Emergency Contacts Name Discharge Info Relation Home Work Mobile Dean Chávez  Father [15] [phone] About your hospitalization You were admitted on:  March 6, 2017 You last received care in the:  [facility] 7 MED SURG You were discharged on:  March 7, 2017 Unit phone number:  [phone] Why you were hospitalized Your primary diagnosis was:  Primary Osteoarthritis Of Right Knee Your diagnoses also included:  Right Knee Djd Providers Seen During Your Hospitalizations Provider Role Specialty Primary office phone Ian Strange MD Attending Provider Orthopedic Surgery [phone] Your Primary Care Physician (PCP) Primary Care Physician Office Phone Office Fax OTHER, PHYS ** None ** ** None ** Follow-up Information Follow up With Details Comments Contact Info Jonathan Crawford MD Call As needed Patient can only remember the practice name and not the physician Ian Strange MD On 3/17/2017 10:40 AM [facility] [address] 
[phone] Current Discharge Medication List  
  
START taking these medications Dose & Instructions Dispensing Information Comments Morning Noon Evening Bedtime  
 oxyCODONE-acetaminophen  mg per tablet Commonly known as:  PERCOCET 10 Your next dose is: Today Notes to Patient:  At 12:35 Dose:  1 Tab Take 1 Tab by mouth every four (4) hours as needed for Pain. Max Daily Amount: 6 Tabs. Quantity:  90 Tab Refills:  0 STOP taking these medications ALEVE 220 mg Cap Generic drug:  naproxen sodium MOBIC 15 mg tablet Generic drug:  meloxicam  
   
  
  
  
Where to Get Your Medications Information on where to get these meds will be given to you by the nurse or doctor. ! Ask your nurse or doctor about these medications  
  oxyCODONE-acetaminophen  mg per tablet Discharge Instructions DO NOT remove the dressing on your knee until [address] visits MAY shower ACTIVITY as tolerated NO driving until cleared by Dr. Rupinder Ramos CALL Dr. Rupinder Ramos if [phone]):  Fever >100.5 Incision becomes red,  swollen or opens up Incision has yellow, thick drainage or an odor Pain is not managed with prescribed medications Excessive nausea and/or vomiting Avoid having pets sleep in bed with you until incision is completely healed DISCHARGE SUMMARY from Nurse The following personal items are in your possession at time of discharge: 
 
Dental Appliances: None Visual Aid: None Home Medications: None Jewelry: None Clothing: Footwear, Pants, Shirt, Socks, Undergarments Other Valuables: None Personal Items Sent to Safe: none PATIENT INSTRUCTIONS: 
 
 
F-face looks uneven A-arms unable to move or move unevenly S-speech slurred or non-existent T-time-call 911 as soon as signs and symptoms begin-DO NOT go Back to bed or wait to see if you get better-TIME IS BRAIN. Warning Signs of HEART ATTACK Call 911 if you have these symptoms: 
? Chest discomfort. Most heart attacks involve discomfort in the center of the chest that lasts more than a few minutes, or that goes away and comes back. It can feel like uncomfortable pressure, squeezing, fullness, or pain. ? Discomfort in other areas of the upper body. Symptoms can include pain or discomfort in one or both arms, the back, neck, jaw, or stomach. ? Shortness of breath with or without chest discomfort. ? Other signs may include breaking out in a cold sweat, nausea, or lightheadedness. Don't wait more than five minutes to call [address]! Fast action can save your life. Calling 911 is almost always the fastest way to get lifesaving treatment. Emergency Medical Services staff can begin treatment when they arrive  up to an hour sooner than if someone gets to the hospital by car. The discharge information has been reviewed with the patient. The patient verbalized understanding. Discharge medications reviewed with the patient and appropriate educational materials and side effects teaching were provided. Discharge Orders None [employer]ends Announcement We are excited to announce that we are making your provider's discharge notes available to you in LivBlends. You will see these notes when they are completed and signed by the physician that discharged you from your recent hospital stay. If you have any questions or concerns about any information you see in LivBlends, please call the Health Information Department where you were seen or reach out to your Primary Care Provider for more information about your plan of care. Introducing [facility] [place] [facility]! Michael Branham introduces [employer]Blends patient portal. Now you can access parts of your medical record, email your doctor's office, and request medication refills online. 1. In your internet browser, go to [email or web address] 2. Click on the First Time User? Click Here link in the Sign In box. You will see the New Member Sign Up page. 3. Enter your [employer]Blends Access Code exactly as it appears below. You will not need to use this code after youve completed the sign-up process.  If you do not sign up before the expiration date, you must request a new code. · Relead Access Code: [email or web address] Expires: 3/23/2017  7:39 AM 
 
4. Enter the last four digits of your Social Security Number (xxxx) and Date of Birth (mm/dd/yyyy) as indicated and click Submit. You will be taken to the next sign-up page. 5. Create a Relead ID. This will be your Relead login ID and cannot be changed, so think of one that is secure and easy to remember. 6. Create a Relead password. You can change your password at any time. 7. Enter your Password Reset Question and Answer. This can be used at a later time if you forget your password. 8. Enter your e-mail address. You will receive e-mail notification when new information is available in [address] 9. Click Sign Up. You can now view and download portions of your medical record. 10. Click the Download Summary menu link to download a portable copy of your medical information. If you have questions, please visit the Frequently Asked Questions section of the Relead website. Remember, Relead is NOT to be used for urgent needs. For medical emergencies, dial 911. Now available from your iPhone and Android! General Information Please provide this summary of care documentation to your next provider. Patient Signature:  ____________________________________________________________ Date:  ____________________________________________________________  
  
Isis Puri Provider Signature:  ____________________________________________________________ Date:  ____________________________________________________________

## 2017-03-06 NOTE — ANESTHESIA PROCEDURE NOTES
Spinal Block    Start time: 3/6/2017 7:46 AM  End time: 3/6/2017 7:47 AM  Performed by: Viola Aguero  Authorized by: Thelma PALACIOS     Pre-procedure:   Indications: at surgeon's request and primary anesthetic  Preanesthetic Checklist: patient identified, risks and benefits discussed, anesthesia consent, site marked, patient being monitored and timeout performed    Timeout Time: 07:45          Spinal Block:   Patient Position:  Seated  Prep Region:  Lumbar  Prep: DuraPrep      Location:  L2-3  Technique:  Single shot  Local:  Lidocaine 1%  Local Dose (mL):  3    Needle:   Needle Type:  Quincke  Needle Gauge:  22 G  Attempts:  1      Events: CSF confirmed, no blood with aspiration and no paresthesia        Assessment:  Insertion:  Uncomplicated  Patient tolerance:  Patient tolerated the procedure well with no immediate complications

## 2017-03-06 NOTE — PROGRESS NOTES
Dual skin assessment completed with Jennifer Abdi RN. No areas of breakdown noted. ACE bandage present to RLE clean, dry, intact.

## 2017-03-06 NOTE — PERIOP NOTES
TRANSFER - OUT REPORT:    Verbal report given to Do Cedeno RN (name) on Jan Saeed  being transferred to 721(unit) for routine post - op       Report consisted of patients Situation, Background, Assessment and   Recommendations(SBAR). Information from the following report(s) SBAR, OR Summary, Intake/Output and MAR was reviewed with the receiving nurse. Lines:   Peripheral IV 03/06/17 Left Hand (Active)   Site Assessment Clean, dry, & intact 3/6/2017 10:15 AM   Phlebitis Assessment 0 3/6/2017 10:15 AM   Infiltration Assessment 0 3/6/2017 10:15 AM   Dressing Status Clean, dry, & intact 3/6/2017 10:15 AM   Dressing Type Transparent 3/6/2017 10:15 AM   Hub Color/Line Status Infusing 3/6/2017 10:15 AM        Opportunity for questions and clarification was provided. VTE prophylaxis orders have been written for Jan Saeed. Patient and family given floor number and nurses name. Family updated re: pt status after security code verified.

## 2017-03-06 NOTE — OP NOTES
[facility]   OPERATIVE REPORT       Name:  Boogie Gray   MR#:  [id number]   :  1965   Account #:  [de-identified]   Date of Adm:  2017       DATE OF SURGERY: 2017    INDICATIONS: The patient is a 51-year-old female with a long   history of progressive disabling right knee pain with bone on   bone deformity per x-rays. We discussed her treatment options in   the form of total joint arthroplasty. She understands the risks   and complications of partial knee replacement versus total, the   indications for revision include, loosening, infection spinning   out of the bearing, wearing out of the prosthesis, continue   wearing out of the lateral compartment. She understands this,   wished to proceed. This was discussed with her in complete   detail. She also understands the risks and complications   inherent with a total knee replacement and understands that   there are significant risks and complications inherent with both   procedures and she wished to proceed. Informed consent was   obtained. PREOPERATIVE DIAGNOSIS: Osteoarthritis, right knee. POSTOPERATIVE DIAGNOSIS: Primarily medial joint arthritis to the   right knee. PROCEDURE PERFORMED: Oxford unicondylar knee replacement. SURGEON: Batool Treadwell MD     ANESTHESIA: Spinal.    PROCEDURE IN DETAIL: The patient was seen in the preoperative   area. Her knee was marked, her chart was updated. She was taken   to operating #8. Successful spinal anesthetic was achieved. The   right lower extremity was prepped and draped as a sterile field   with a tourniquet applied on the upper right thigh over Webril   padding. She received 2 grams of Ancef perioperatively as well   as 1 gram of tranexamic acid.  A timeout was initiated by this   surgeon and was confirmed by the operative team as to site,   potential allergies, potential complicating factors and agreed   to by the operative team. We proceeded with a sterile Esmarch 6-  inch exsanguination of the right lower extremity and placing the   tourniquet to 275 mmHg. A modified median parapatellar incision was made from the   superior pole of the patella medially down to the medial aspect   of the tibial tubercle. Dissection was carried through skin and   subcutaneous tissue sharply down to the capsule. The capsule was   entered into. A moderate-sized effusion was evacuated from the   knee. The medial meniscus anteriorly was removed as well as   prepatellar fat pad. We then inspected the trochlear notch,   which was found to have some mild grade 2 chondromalacia, but   otherwise intact. There was a medial facet large osteophyte. The   lateral compartment was free of any significant pathology. ACL   was intact to probing and stressing, and visualization as well   the PCL. We then proceed to remove osteophytes circumferentially around   the medial femoral condyle, which noted to be bone on bone on   this side. We then removed the medial facet of the patellar with   an oscillating saw after elevating the soft tissue in this area. We thoroughly irrigated. We then marked our central section of   the femur after removing the osteophytes medially. We then sized   for a medium size femur. We then utilized the extramedullary   guide system and made our tibial cut. We then placed in a 4 mm   Guide dami placing it 1 cm anterior medial to the notch, placed in our guide dami   down the femur. Used this to make our 2 drill holes for the   femoral component. We did our distal femoral resection with the   guide in place, removed this guide. We then milled with a 0   spigot. We checked our flexion extension gaps and then placed a   #2 spigot in place. We then milled over this, removed all   extraneous bone circumferentially around and smoothed this down. We trialed and had good fit and fill with a 5 mm bearing.  We   then removed all the trial components to the back table,   prepared the keeled, drilled both the femoral and the tibial   articular surface with a 2 mm drill bit for better cementation. We thoroughly PulsaVac lavaged, removed posterior horn of the   medial meniscus under direct visualization. We then cemented   with 1 batch of Palacos cement, our tibia and our femur, holding   the knee in about 30-45 degrees of flexion while the cement had   hardened. We removed all extraneous cement. We pressurized with   a 6 bearing, place in a 5. We had good extension, full flexion   and did not kick up the bearing in any unusual way, and had good   tracking of the new bearing. We instilled a cocktail of 30 mL mix of Toradol and morphine and   Marcaine in divided doses around the capsule for postoperative   pain relief. We then closed the knee in a layered fashion with   #1 Vicryl for the extensor mechanism, deep with 0 Vicryl, and   clips in the skin. EBL was minimal. Tourniquet time was slightly   over 1 hour. Sterile bulky dressing was applied. She will be   admitted to our service.         MD FELICITAS Chacon / CAESAR   D:  03/06/2017   09:44   T:  03/06/2017   10:21   Job #:  [id number]

## 2017-03-07 VITALS
TEMPERATURE: 98 F | BODY MASS INDEX: 41.62 KG/M2 | WEIGHT: 226.19 LBS | HEART RATE: 74 BPM | SYSTOLIC BLOOD PRESSURE: 156 MMHG | OXYGEN SATURATION: 97 % | RESPIRATION RATE: 19 BRPM | DIASTOLIC BLOOD PRESSURE: 68 MMHG | HEIGHT: 62 IN

## 2017-03-07 LAB — HGB BLD-MCNC: 10.1 G/DL (ref 11.7–15.4)

## 2017-03-07 PROCEDURE — 97165 OT EVAL LOW COMPLEX 30 MIN: CPT

## 2017-03-07 PROCEDURE — 74011250637 HC RX REV CODE- 250/637: Performed by: ORTHOPAEDIC SURGERY

## 2017-03-07 PROCEDURE — 85018 HEMOGLOBIN: CPT | Performed by: ORTHOPAEDIC SURGERY

## 2017-03-07 PROCEDURE — 97530 THERAPEUTIC ACTIVITIES: CPT

## 2017-03-07 PROCEDURE — 36415 COLL VENOUS BLD VENIPUNCTURE: CPT | Performed by: ORTHOPAEDIC SURGERY

## 2017-03-07 PROCEDURE — 97110 THERAPEUTIC EXERCISES: CPT

## 2017-03-07 PROCEDURE — 74011250636 HC RX REV CODE- 250/636: Performed by: ORTHOPAEDIC SURGERY

## 2017-03-07 RX ORDER — OXYCODONE AND ACETAMINOPHEN 10; 325 MG/1; MG/1
1 TABLET ORAL
Qty: 90 TAB | Refills: 0 | Status: SHIPPED | OUTPATIENT
Start: 2017-03-07

## 2017-03-07 RX ADMIN — OXYCODONE HYDROCHLORIDE AND ACETAMINOPHEN 1 TABLET: 7.5; 325 TABLET ORAL at 08:35

## 2017-03-07 RX ADMIN — SODIUM CHLORIDE 100 ML/HR: 900 INJECTION, SOLUTION INTRAVENOUS at 10:27

## 2017-03-07 RX ADMIN — CEFAZOLIN 2 G: 1 INJECTION, POWDER, FOR SOLUTION INTRAMUSCULAR; INTRAVENOUS; PARENTERAL at 00:36

## 2017-03-07 RX ADMIN — CELECOXIB 200 MG: 200 CAPSULE ORAL at 11:47

## 2017-03-07 RX ADMIN — ASPIRIN 325 MG: 325 TABLET, DELAYED RELEASE ORAL at 08:35

## 2017-03-07 RX ADMIN — ENOXAPARIN SODIUM 40 MG: 40 INJECTION SUBCUTANEOUS at 08:34

## 2017-03-07 RX ADMIN — STANDARDIZED SENNA CONCENTRATE AND DOCUSATE SODIUM 2 TABLET: 8.6; 5 TABLET, FILM COATED ORAL at 08:34

## 2017-03-07 RX ADMIN — OXYCODONE HYDROCHLORIDE AND ACETAMINOPHEN 1 TABLET: 7.5; 325 TABLET ORAL at 03:12

## 2017-03-07 NOTE — DISCHARGE SUMMARY
Total Joint Discharge Summary    Patient: Toya Talamantes MRN: [id number]  SSN: [id number]    YOB: 1965  Age: 46 y.o. Sex: female      Admit Date: 3/6/2017  Discharge Date:3/7/2017  Admitting Physician: Chuck Draper MD   Discharge Physician: Chuck Draper MD   Admission Diagnoses: Primary osteoarthritis of right knee [M17.11]   Discharge Diagnoses:   Patient Active Problem List   Diagnosis Code    Primary osteoarthritis of right knee M17.11    Dental caries K02.9    Right knee DJD M17.9     Surgeon: Surgeon(s):  Chuck Draper MD   DVT Prophylaxis: Lovenox, ASA                                  MAX Hose  Postoperative Complications: none detected  Last Hemoglobin:   Lab Results   Component Value Date/Time    HGB 10.1 03/07/2017 04:05 AM        Wound appears to be healing without any evidence of infection. Physical Therapy started on the day following surgery and progressed to independent ambulation with the aid of a walker. At the time of discharge, patient is able to go up and down stairs and has understanding of precautions needed following surgery. Discharged Disposition: Home    Discharge Instructions:   Anticoagulate with Ecotrin 325 mg orally once a day for 4 weeks   Resume pre-hospital diet             Resume home medications per medical continuation form      Ambulate with walker, appropriate total joint protocol   Follow up in office as scheduled    Call doctor immediately if temperature is greater etpk691.5°F, increased pain, swelling, drainage.    If shortness of breath or chest pain, immediately go to the Emergency Department    Signed By: Chuck Draper MD     March 7, 2017

## 2017-03-07 NOTE — PROGRESS NOTES
Problem: Mobility Impaired (Adult and Pediatric)  Goal: *Acute Goals and Plan of Care (Insert Text)  LTG:  (1.)Ms. Lucas will move from supine to sit and sit to supine , scoot up and down and roll side to side in bed with INDEPENDENCE within 5 day(s). (2.)Ms. Lucas will transfer from bed to chair and chair to bed with MODIFIED INDEPENDENCE using the least restrictive device within 5 day(s). (3.)Ms. Lucas will ambulate with MODIFIED INDEPENDENCE for 150+ feet with the least restrictive device within 5 day(s). (4.)Ms. Lucas will participate in therapeutic activity/exerices x 20 for increased strength within 5 days. (5.)Ms. Lucas will have 0 degrees R knee extension PROM to increase functional mobility and normalize gait pattern within 5 days. (6.)Ms. Lucas will have 110 degrees R knee flexion AROM to increase functional mobility and transfers within 5 days. ______________________________________________________________________________________________  PHYSICAL THERAPY: Daily Note, Treatment Day: 1st and PM 3/7/2017  INPATIENT: Hospital Day: 2  Payor: Anibal [place] / [place]UE E[place]ENTIALS COLLIN / Product Type: COLLIN /    R LE WBAT     NAME/AGE/GENDER: Bonita Rocha is a 46 y.o. female             PRIMARY DIAGNOSIS: Primary osteoarthritis of right knee [M17.11] Primary osteoarthritis of right knee Primary osteoarthritis of right knee  Procedure(s) (LRB):  PARTIAL OXFORD  (Right)  1 Day Post-Op  ICD-10: Treatment Diagnosis:       · Generalized Muscle Weakness (M62.81)  · Other abnormalities of gait and mobility (R26.89)   Precaution/Allergies:  Pcn [penicillins]       ASSESSMENT:      Ms. Philippe Prescott is a 46 y.o. female s/p above performed by Dr. Thea Porter who is WBAT on R LE. Patient was supine upon contact and agreeable to PT. Patient able to perform bed mobility and transfers with supervision.  Once standing patient able to increase gait distance to 200' with use of rolling walker, SBA-supervision and occasional cues for sequencing. Patient performs stair training with occasional verbal cues for proper technique but no difficulties noted. Patient sits in therapy gym and participates in LE ROM exercises to improve functional ROM for transfers, gait and overall mobility as well as reduce stiffness/soreness related to post op pain. Right knee ROM measured at 90 degrees flexion and -10 degrees extension. Overall great progress towards physical therapy goals. Will continue efforts. This section established at most recent assessment   PROBLEM LIST (Impairments causing functional limitations):  1. Decreased Strength  2. Decreased Transfer Abilities  3. Decreased Ambulation Ability/Technique  4. Decreased Balance  5. Decreased Flexibility/Joint Mobility    INTERVENTIONS PLANNED: (Benefits and precautions of physical therapy have been discussed with the patient.)  1. Balance Exercise  2. Bed Mobility  3. Family Education  4. Gait Training  5. Neuromuscular Re-education/Strengthening  6. Range of Motion (ROM)  7. Therapeutic Activites  8. Therapeutic Exercise/Strengthening  9. Transfer Training  10. Group Therapy      TREATMENT PLAN: Frequency/Duration: twice daily for duration of hospital stay  Rehabilitation Potential For Stated Goals: GOOD      RECOMMENDED REHABILITATION/EQUIPMENT: (at time of discharge pending progress): Continue Skilled Therapy. HISTORY:   History of Present Injury/Illness (Reason for Referral):  S/P PARTIAL OXFORD  (Right)  Past Medical History/Comorbidities:   Ms. Chris Burns  has a past medical history of Arthritis; Diverticulitis; Morbid obesity ([address] and Right knee pain. Ms. Chris Burns  has a past surgical history that includes abdomen surgery proc unlisted () and  section ().   Social History/Living Environment:   Home Environment: Trailer/mobile home  # Steps to Enter: 1  One/Two Story Residence: One story  Living Alone: No  Support Systems: Spouse/Significant Other/Partner  Patient Expects to be Discharged to[de-identified] Unknown  Current DME Used/Available at Home: Walker, rolling, Shower chair, Commode, bedside, [address], [address], quad, [address] [facility], [place], straight  Tub or Shower Type: Tub/Shower combination  Prior Level of Function/Work/Activity:  Pt reports she lives in a mobile home with her spouse that has one step to enter. She reports being independent with ADLs and ambulation prior to surgery with no recent falls. Number of Personal Factors/Comorbidities that affect the Plan of Care:  · Obesity 1-2: MODERATE COMPLEXITY   EXAMINATION:   Most Recent Physical Functioning:   Gross Assessment:         RLE AROM  R Knee Flexion: 90  R Knee Extension: -10        Posture:     Balance:  Sitting: Intact  Standing: Impaired  Standing - Static: Good  Standing - Dynamic : Fair Bed Mobility:  Supine to Sit: Supervision  Wheelchair Mobility:     Transfers:  Sit to Stand: Supervision  Stand to Sit: Supervision  Gait:     Base of Support: Shift to left  Speed/Elida: Slow  Step Length: Left shortened;Right shortened  Gait Abnormalities: Decreased step clearance;Trunk sway increased  Distance (ft): 200 Feet (ft)  Assistive Device: Walker, rolling  Ambulation - Level of Assistance: Stand-by asssistance  Number of Stairs Trained: 3 (x2)  Stairs - Level of Assistance: Contact guard assistance       Body Structures Involved:  1. Bones  2. Joints  3. Muscles Body Functions Affected:  1. Sensory/Pain  2. Neuromusculoskeletal  3. Movement Related Activities and Participation Affected:  1. General Tasks and Demands  2. Mobility  3. Community, Social and [place]   Number of elements that affect the Plan of Care: 4+: HIGH COMPLEXITY   CLINICAL PRESENTATION:   Presentation: Stable and uncomplicated: LOW COMPLEXITY   CLINICAL DECISION MAKIN [facility] Mobility Inpatient Short Form  How much difficulty does the patient currently have. .. Unable A Lot A Little None   1.   Turning over in bed (including adjusting bedclothes, sheets and blankets)? [ ] 1   [ ] 2   [ ] 3   [X] 4   2. Sitting down on and standing up from a chair with arms ( e.g., wheelchair, bedside commode, etc.)   [ ] 1   [ ] 2   [X] 3   [ ] 4   3. Moving from lying on back to sitting on the side of the bed? [ ] 1   [ ] 2   [ ] 3   [X] 4   How much help from another person does the patient currently need. .. Total A Lot A Little None   4. Moving to and from a bed to a chair (including a wheelchair)? [ ] 1   [ ] 2   [X] 3   [ ] 4   5. Need to walk in hospital room? [ ] 1   [ ] 2   [X] 3   [ ] 4   6. Climbing 3-5 steps with a railing? [ ] 1   [X] 2   [ ] 3   [ ] 4   © 2007, Trustees of [address] [place] [address], under license to [employer]. All rights reserved    Score:  Initial: 19 Most Recent: X (Date: -- )     Interpretation of Tool:  Represents activities that are increasingly more difficult (i.e. Bed mobility, Transfers, Gait). Score 24 23 22-20 19-15 14-10 9-7 6       Modifier CH CI CJ CK CL CM CN         · Mobility - Walking and Moving Around:               - CURRENT STATUS:    CK - 40%-59% impaired, limited or restricted               - GOAL STATUS:           CJ - 20%-39% impaired, limited or restricted               - D/C STATUS:                       ---------------To be determined---------------  Payor: BLUE CROSS / [place]UE ESSENTIALS COLLIN / Product Type: COLLIN /       Medical Necessity:     · Patient demonstrates good rehab potential due to higher previous functional level. Reason for Services/Other Comments:  · Patient continues to require skilled intervention due to decreased functional mobility, balance, and difficulty with ambulation.    Use of outcome tool(s) and clinical judgement create a POC that gives a: Clear prediction of patient's progress: LOW COMPLEXITY                 TREATMENT:   (In addition to Assessment/Re-Assessment sessions the following treatments were rendered)   Pre-treatment Symptoms/Complaints:  R knee pain  Pain: Initial:   Pain Intensity 1: 0 (before and after treatment)  Post Session:  5/10      Therapeutic Activity: (    14 Minutes): Therapeutic activities including bed mobility training, transfer training, static/dynamic standing balance activities, ambulation on level ground, stair training, instruction in sequencing with rolling walker, and patient education to improve mobility, strength, balance and stiffness/soreness. Required moderate verbal, tactile, and manual cues   to promote static and dynamic balance in standing and promote coordination of bilateral, lower extremity(s). Therapeutic Exercise: ( 10 Minutes):  Exercises per grid below to improve mobility, strength, balance and stiffness/soreness. Required moderate visual, verbal, manual and tactile cues to promote proper body alignment, promote proper body posture and promote proper body mechanics. Progressed range, repetitions and complexity of movement as indicated. Date:  3/7/17 Date:   Date:     ACTIVITY/EXERCISE AM PM AM PM AM PM   GROUP THERAPY  []  []  []  []  []  []   Ankle Pumps x15B A x15B A       Quad Sets x10R A x10R A       Gluteal Sets         Hip ABd/ADduction         Straight Leg Raises x5R A        Knee Slides x5R A x8R A       Short Arc [facility] Quads x10R A x10R A       Chair Slides                  B = bilateral; AA = active assistive; A = active; P = passive        Braces/Orthotics/Lines/Etc:   · None  Treatment/Session Assessment:    · Response to Treatment:  See above  · Interdisciplinary Collaboration:  · Physical Therapy Assistant and Registered Nurse  · After treatment position/precautions:  · Supine in bed, Bed/Chair-wheels locked, Bed in low position, Call light within reach, RN notified and Family at bedside  · Compliance with Program/Exercises: Will assess as treatment progresses.   · Recommendations/Intent for next treatment session: \"Next visit will focus on advancements to more challenging activities and reduction in assistance provided\".   Total Treatment Duration:  PT Patient Time In/Time Out  Time In: 1326  Time Out: [address] [facility]

## 2017-03-07 NOTE — PROGRESS NOTES
Discharge instructions and prescriptions given and explained to pt. Pt verbalized understanding. Medication side effects sheet reviewed with pt. Pt to be discharged home, after working with PT this afternoon.

## 2017-03-07 NOTE — PROGRESS NOTES
Problem: Mobility Impaired (Adult and Pediatric)  Goal: *Acute Goals and Plan of Care (Insert Text)  LTG:  (1.)Ms. Lucas will move from supine to sit and sit to supine , scoot up and down and roll side to side in bed with INDEPENDENCE within 5 day(s). (2.)Ms. Lucas will transfer from bed to chair and chair to bed with MODIFIED INDEPENDENCE using the least restrictive device within 5 day(s). (3.)Ms. Lucas will ambulate with MODIFIED INDEPENDENCE for 150+ feet with the least restrictive device within 5 day(s). (4.)Ms. Lucas will participate in therapeutic activity/exerices x 20 for increased strength within 5 days. (5.)Ms. Lucas will have 0 degrees R knee extension PROM to increase functional mobility and normalize gait pattern within 5 days. (6.)Ms. Lucas will have 110 degrees R knee flexion AROM to increase functional mobility and transfers within 5 days. ______________________________________________________________________________________________  PHYSICAL THERAPY: Daily Note, Treatment Day: 1st and AM 3/7/2017  INPATIENT: Hospital Day: 2  Payor: Donavan Hu / [place] COLL[place] / Product Type: COLLIN /    R LE WBAT     NAME/AGE/GENDER: Romario Carballo is a 46 y.o. female             PRIMARY DIAGNOSIS: Primary osteoarthritis of right knee [M17.11] Primary osteoarthritis of right knee Primary osteoarthritis of right knee  Procedure(s) (LRB):  PARTIAL OXFORD  (Right)  1 Day Post-Op  ICD-10: Treatment Diagnosis:       · Generalized Muscle Weakness (M62.81)  · Other abnormalities of gait and mobility (R26.89)   Precaution/Allergies:  Pcn [penicillins]       ASSESSMENT:      Ms. Asiya Suero is a 46 y.o. female s/p above performed by Dr. Dennis Mcgee who is WBAT on R LE. Patient was supine upon contact and agreeable to PT. Patient able to perform bed mobility with supervision and transfer to standing with SBA.  Once standing patient able to increase gait distance to 120' with use of rolling walker, SBA-supervision and occasional cues for sequencing. Patient returns to recliner chair where she participates in LE ROM exercises to improve functional ROM for transfers, gait and overall mobility as well as reduce stiffness/soreness related to post op pain. Unable to measure Right knee ROM due to bulky ace wrap. Overall great progress towards physical therapy goals. Will continue efforts. This section established at most recent assessment   PROBLEM LIST (Impairments causing functional limitations):  1. Decreased Strength  2. Decreased Transfer Abilities  3. Decreased Ambulation Ability/Technique  4. Decreased Balance  5. Decreased Flexibility/Joint Mobility    INTERVENTIONS PLANNED: (Benefits and precautions of physical therapy have been discussed with the patient.)  1. Balance Exercise  2. Bed Mobility  3. Family Education  4. Gait Training  5. Neuromuscular Re-education/Strengthening  6. Range of Motion (ROM)  7. Therapeutic Activites  8. Therapeutic Exercise/Strengthening  9. Transfer Training  10. Group Therapy      TREATMENT PLAN: Frequency/Duration: twice daily for duration of hospital stay  Rehabilitation Potential For Stated Goals: GOOD      RECOMMENDED REHABILITATION/EQUIPMENT: (at time of discharge pending progress): Continue Skilled Therapy. HISTORY:   History of Present Injury/Illness (Reason for Referral):  S/P PARTIAL OXFORD  (Right)  Past Medical History/Comorbidities:   Ms. Mariana Marshall  has a past medical history of Arthritis; Diverticulitis; Morbid obesity ([address] and Right knee pain. Ms. Mariana Marshall  has a past surgical history that includes abdomen surgery proc unlisted () and  section ().   Social History/Living Environment:   Home Environment: Trailer/mobile home  # Steps to Enter: 1  One/Two Story Residence: One story  Living Alone: No  Support Systems: Spouse/Significant Other/Partner  Patient Expects to be Discharged to[de-identified] Unknown  Current DME Used/Available at Home: Walker, rolling, Shower chair, Commode, bedside, [place], quad, [place], straight  Tub or Shower Type: Tub/Shower combination  Prior Level of Function/Work/Activity:  Pt reports she lives in a mobile home with her spouse that has one step to enter. She reports being independent with ADLs and ambulation prior to surgery with no recent falls. Number of Personal Factors/Comorbidities that affect the Plan of Care:  · Obesity 1-2: MODERATE COMPLEXITY   EXAMINATION:   Most Recent Physical Functioning:   Gross Assessment:                  Posture:     Balance:  Sitting: Intact  Standing: Impaired  Standing - Static: Good  Standing - Dynamic : Fair Bed Mobility:  Supine to Sit: Supervision  Wheelchair Mobility:     Transfers:  Sit to Stand: Stand-by asssistance  Stand to Sit: Stand-by asssistance  Gait:     Base of Support: Shift to left  Speed/Elida: Slow  Step Length: Left shortened;Right shortened  Gait Abnormalities: Decreased step clearance;Trunk sway increased  Distance (ft): 120 Feet (ft)  Assistive Device: Walker, rolling  Ambulation - Level of Assistance: Stand-by asssistance       Body Structures Involved:  1. Bones  2. Joints  3. Muscles Body Functions Affected:  1. Sensory/Pain  2. Neuromusculoskeletal  3. Movement Related Activities and Participation Affected:  1. General Tasks and Demands  2. Mobility  3. Community, Social and [place]   Number of elements that affect the Plan of Care: 4+: HIGH COMPLEXITY   CLINICAL PRESENTATION:   Presentation: Stable and uncomplicated: LOW COMPLEXITY   CLINICAL DECISION MAKIN [facility] Mobility Inpatient Short Form  How much difficulty does the patient currently have. .. Unable A Lot A Little None   1. Turning over in bed (including adjusting bedclothes, sheets and blankets)? [ ] 1   [ ] 2   [ ] 3   [X] 4   2.   Sitting down on and standing up from a chair with arms ( e.g., wheelchair, bedside commode, etc.)   [ ] 1   [ ] 2   [X] 3 [ ] 4   3. Moving from lying on back to sitting on the side of the bed? [ ] 1   [ ] 2   [ ] 3   [X] 4   How much help from another person does the patient currently need. .. Total A Lot A Little None   4. Moving to and from a bed to a chair (including a wheelchair)? [ ] 1   [ ] 2   [X] 3   [ ] 4   5. Need to walk in hospital room? [ ] 1   [ ] 2   [X] 3   [ ] 4   6. Climbing 3-5 steps with a railing? [ ] 1   [X] 2   [ ] 3   [ ] 4   © 2007, Trustees of [address] [place] [address] [address] [address], under license to [employer]. All rights reserved    Score:  Initial: 19 Most Recent: X (Date: -- )     Interpretation of Tool:  Represents activities that are increasingly more difficult (i.e. Bed mobility, Transfers, Gait). Score 24 23 22-20 19-15 14-10 9-7 6       Modifier CH CI CJ CK CL CM CN         · Mobility - Walking and Moving Around:               - CURRENT STATUS:    CK - 40%-59% impaired, limited or restricted               - GOAL STATUS:           CJ - 20%-39% impaired, limited or restricted               - D/C STATUS:                       ---------------To be determined---------------  Payor: BLUE CROSS / [place] BLUE ESSENTIALS COLLIN / Product Type: COLLIN /       Medical Necessity:     · Patient demonstrates good rehab potential due to higher previous functional level. Reason for Services/Other Comments:  · Patient continues to require skilled intervention due to decreased functional mobility, balance, and difficulty with ambulation. Use of outcome tool(s) and clinical judgement create a POC that gives a: Clear prediction of patient's progress: LOW COMPLEXITY                 TREATMENT:   (In addition to Assessment/Re-Assessment sessions the following treatments were rendered)   Pre-treatment Symptoms/Complaints:  R knee pain  Pain: Initial:   Pain Intensity 1: 0 (before and after treatment)  Post Session:  5/10      Therapeutic Activity: (    14 Minutes):   Therapeutic activities including bed mobility training, transfer training, static/dynamic standing balance activities, ambulation on level ground, instruction in sequencing with rolling walker, and patient education to improve mobility, strength, balance and stiffness/soreness. Required moderate verbal, tactile, and manual cues   to promote static and dynamic balance in standing and promote coordination of bilateral, lower extremity(s). Therapeutic Exercise: ( 10 Minutes):  Exercises per grid below to improve mobility, strength, balance and stiffness/soreness. Required moderate visual, verbal, manual and tactile cues to promote proper body alignment, promote proper body posture and promote proper body mechanics. Progressed range, repetitions and complexity of movement as indicated. Date:  3/7/17 Date:   Date:     ACTIVITY/EXERCISE AM PM AM PM AM PM   GROUP THERAPY  []  []  []  []  []  []   Ankle Pumps x15B A        Quad Sets x10R A        Gluteal Sets         Hip ABd/ADduction         Straight Leg Raises x5R A        Knee Slides x5R A        Short Arc Quads         Long Arc Quads x10R A        Chair Slides                  B = bilateral; AA = active assistive; A = active; P = passive        Braces/Orthotics/Lines/Etc:   · None  Treatment/Session Assessment:    · Response to Treatment:  See above  · Interdisciplinary Collaboration:  · Physical Therapy Assistant and Registered Nurse  · After treatment position/precautions:  · Up in chair, Bed/Chair-wheels locked, Call light within reach and RN notified  · Compliance with Program/Exercises: Will assess as treatment progresses. · Recommendations/Intent for next treatment session: \"Next visit will focus on advancements to more challenging activities and reduction in assistance provided\".   Total Treatment Duration:  PT Patient Time In/Time Out  Time In: 0946  Time Out: 3[address] [facility]

## 2017-03-07 NOTE — DISCHARGE INSTRUCTIONS
DO NOT remove the dressing on your knee until [address] visits    MAY shower    ACTIVITY as tolerated    NO driving until cleared by Dr. Fabiola Arias if [phone]):  Fever >100.5             Incision becomes red,  swollen or opens up                     Incision has yellow, thick drainage or an odor                    Pain is not managed with prescribed medications                    Excessive nausea and/or vomiting    Avoid having pets sleep in bed with you until incision is completely healed    DISCHARGE SUMMARY from Nurse    The following personal items are in your possession at time of discharge:    Dental Appliances: None  Visual Aid: None     Home Medications: None  Jewelry: None  Clothing: Footwear, Pants, Shirt, Socks, Undergarments  Other Valuables: None  Personal Items Sent to Safe: none          PATIENT INSTRUCTIONS:    After general anesthesia or intravenous sedation, for 24 hours or while taking prescription Narcotics:  · Limit your activities  · Do not drive and operate hazardous machinery  · Do not make important personal or business decisions  · Do  not drink alcoholic beverages  · If you have not urinated within 8 hours after discharge, please contact your surgeon on call. Report the following to your surgeon:  · Excessive pain, swelling, redness or odor of or around the surgical area  · Temperature over 100.5  · Nausea and vomiting lasting longer than 4 hours or if unable to take medications  · Any signs of decreased circulation or nerve impairment to extremity: change in color, persistent  numbness, tingling, coldness or increase pain  · Any questions        What to do at Home:  Recommended activity: See surgical instructions, diet as tolerated. *  Please give a list of your current medications to your Primary Care Provider.     *  Please update this list whenever your medications are discontinued, doses are      changed, or new medications (including over-the-counter products) are added.    *  Please carry medication information at all times in case of emergency situations. These are general instructions for a healthy lifestyle:    No smoking/ No tobacco products/ Avoid exposure to second hand smoke    Surgeon General's Warning:  Quitting smoking now greatly reduces serious risk to your health. Obesity, smoking, and sedentary lifestyle greatly increases your risk for illness    A healthy diet, regular physical exercise & weight monitoring are important for maintaining a healthy lifestyle    You may be retaining fluid if you have a history of heart failure or if you experience any of the following symptoms:  Weight gain of 3 pounds or more overnight or 5 pounds in a week, increased swelling in our hands or feet or shortness of breath while lying flat in bed. Please call your doctor as soon as you notice any of these symptoms; do not wait until your next office visit. Recognize signs and symptoms of STROKE:    F-face looks uneven    A-arms unable to move or move unevenly    S-speech slurred or non-existent    T-time-call 911 as soon as signs and symptoms begin-DO NOT go       Back to bed or wait to see if you get better-TIME IS BRAIN. Warning Signs of HEART ATTACK     Call 911 if you have these symptoms:   Chest discomfort. Most heart attacks involve discomfort in the center of the chest that lasts more than a few minutes, or that goes away and comes back. It can feel like uncomfortable pressure, squeezing, fullness, or pain.  Discomfort in other areas of the upper body. Symptoms can include pain or discomfort in one or both arms, the back, neck, jaw, or stomach.  Shortness of breath with or without chest discomfort.  Other signs may include breaking out in a cold sweat, nausea, or lightheadedness. Don't wait more than five minutes to call 911 - MINUTES MATTER! Fast action can save your life. Calling 911 is almost always the fastest way to get lifesaving treatment. Emergency Medical Services staff can begin treatment when they arrive -- up to an hour sooner than if someone gets to the hospital by car. The discharge information has been reviewed with the patient. The patient verbalized understanding. Discharge medications reviewed with the patient and appropriate educational materials and side effects teaching were provided.

## 2017-03-07 NOTE — PROGRESS NOTES
Problem: Self Care Deficits Care Plan (Adult)  Goal: *Acute Goals and Plan of Care (Insert Text)  1. Patient will complete lower body bathing and dressing with modified independence and adaptive equipment as needed. 2. Patient will complete functional transfers with modified independence and adaptive equipment as needed. 3. Patient will complete toileting with modified independence. 4. Patient will tolerate at least 15 minutes of BUE therapeutic exercises to strengthen BUE to aid in functional transfers. 5. Patient will complete functional mobility of household distances with modified independence and adaptive equipment as needed. Timeframe: 7 visits       OCCUPATIONAL THERAPY: Initial Assessment and AM 3/7/2017  INPATIENT: Hospital Day: 2  Payor: BLUE CROSS / [place]ENTIAL[place] / Product Type: Nick Muñoz /      NAME/AGE/GENDER: Anna Marie Paredes is a 46 y.o. female             PRIMARY DIAGNOSIS:  Primary osteoarthritis of right knee [M17.11] Primary osteoarthritis of right knee Primary osteoarthritis of right knee  Procedure(s) (LRB):  PARTIAL [place]  (Right)  1 Day Post-Op  ICD-10: Treatment Diagnosis:        · Pain in Right Knee (M25.561)  · Stiffness of Right Knee, Not elsewhere classified (M25.661)  · Generalized Muscle Weakness (M62.81)  · Other lack of cordination (R27.8)   Precautions/Allergies:         Pcn [penicillins]       ASSESSMENT:      Ms. Kayleen Starks presents s/p R knee sx (see above). Pt is WBAT in R LE. Pt was up to the bathroom upon arrival with supportive spouse assisting her as needed. Pt completed functional mobility with rolling walker into the hallway with SBA. Pt able to tolerate standing at sink with supervision to brush her teeth. Pt with good UE strength to assist with functional mobility with use of rolling walker. Pt educated on positioning of R LE in chair/bed with pt verbalizing understanding.  Pt was able to don/doff sock on R LE today and overall is moving quite well. Pt states she plans to go to outpatient PT at discharge. Pt will benefit from acute OT services to address stated goals and plan of care. This section established at most recent assessment   PROBLEM LIST (Impairments causing functional limitations):  1. Decreased Strength  2. Decreased ADL/Functional Activities  3. Decreased Transfer Abilities  4. Decreased Ambulation Ability/Technique  5. Decreased Balance  6. Increased Pain  7. Decreased Activity Tolerance  8. Decreased Flexibility/Joint Mobility  9. Decreased [place] with Home Exercise Program    INTERVENTIONS PLANNED: (Benefits and precautions of occupational therapy have been discussed with the patient.)  1. Activities of daily living training  2. Adaptive equipment training  3. Balance training  4. Clothing management  5. Donning&doffing training  6. Neuromuscular re-eduation  7. Theraputic activity  8. Theraputic exercise      TREATMENT PLAN: Frequency/Duration: Follow patient 6 times per week to address above goals. Rehabilitation Potential For Stated Goals: EXCELLENT      RECOMMENDED REHABILITATION/EQUIPMENT: (at time of discharge pending progress): Continue Skilled Therapy. OCCUPATIONAL PROFILE AND HISTORY:   History of Present Injury/Illness (Reason for Referral):  See H&P  Past Medical History/Comorbidities:   Ms. Ike Montenegro  has a past medical history of Arthritis; Diverticulitis; Morbid obesity ([facility] [address] and Right knee pain. Ms. Ike Montenegro  has a past surgical history that includes abdomen surgery proc unlisted () and  section ().   Social History/Living Environment:   Home Environment: Trailer/mobile home  # Steps to Enter: 1  One/Two Story Residence: One story  Living Alone: No  Support Systems: Spouse/Significant Other/Partner  Patient Expects to be Discharged to[de-identified] Unknown  Current DME Used/Available at Home: Walker, rolling, Shower chair, Commode, bedside, Const[place] Grumbling, quad, [place] Grumbling, straight  Tub or Shower Type: Tub/Shower combination  Prior Level of Function/Work/Activity:  Pt lives with  and was independent with ADL/functional mobility. Works at [employer]. Number of Personal Factors/Comorbidities that affect the Plan of Care: Brief history (0):  LOW COMPLEXITY   ASSESSMENT OF OCCUPATIONAL PERFORMANCE[de-identified]   Activities of Daily Living:           Basic ADLs (From Assessment) Complex ADLs (From Assessment)   Basic ADL  Feeding: Independent  Oral Facial Hygiene/Grooming: Setup  Bathing: Minimum assistance  Upper Body Dressing: Setup  Lower Body Dressing: Minimum assistance  Toileting: Stand by assistance Instrumental ADL  Meal Preparation: Minimum assistance  Homemaking: Maximum assistance   Grooming/Bathing/Dressing Activities of Daily Living     Cognitive Retraining  Safety/Judgement: Awareness of environment                 Functional Transfers  Toilet Transfer : Contact guard assistance  Tub Transfer: Moderate assistance  Shower Transfer: Minimum assistance     Bed/Mat Mobility  Supine to Sit: Supervision  Sit to Stand: Stand-by asssistance  Bed to Chair: Stand-by asssistance  Scooting: Modified independent          Most Recent Physical Functioning:   Gross Assessment:  AROM: Within functional limits (B UE)  Strength:  Within functional limits (B UE)               Posture:     Balance:  Sitting: Intact  Standing: Impaired  Standing - Static: Good  Standing - Dynamic : Fair Bed Mobility:  Supine to Sit: Supervision  Scooting: Modified independent  Wheelchair Mobility:     Transfers:  Sit to Stand: Stand-by asssistance  Stand to Sit: Stand-by asssistance  Bed to Chair: Stand-by asssistance                 Patient Vitals for the past 6 hrs:       BP BP Patient Position SpO2 Pulse   03/07/17 0805 142/76 At rest 98 % (!) 58   03/07/17 1146 156/68 At rest 97 % 74        Mental Status  Neurologic State: Alert  Orientation Level: Oriented X4  Cognition: Appropriate decision making, Appropriate for age attention/concentration, Follows commands  Perception: Appears intact  Perseveration: No perseveration noted  Safety/Judgement: Awareness of environment                               Physical Skills Involved:  1. Range of Motion  2. Balance  3. Mobility  4. Endurance Cognitive Skills Affected (resulting in the inability to perform in a timely and safe manner):  1. WNL Psychosocial Skills Affected:  1. n/a   Number of elements that affect the Plan of Care: 3-5:  MODERATE COMPLEXITY   CLINICAL DECISION MAKIN[address] [place] [address] AM-PAC 6 Clicks   Basic Mobility Inpatient Short Form  How much help from another person does the patient currently need. .. Total A Lot A Little None   1. Putting on and taking off regular lower body clothing?   [ ] 1   [ ] 2   [X] 3   [ ] 4   2. Bathing (including washing, rinsing, drying)? [ ] 1   [ ] 2   [X] 3   [ ] 4   3. Toileting, which includes using toilet, bedpan or urinal?   [ ] 1   [ ] 2   [X] 3   [ ] 4   4. Putting on and taking off regular upper body clothing?   [ ] 1   [ ] 2   [X] 3   [ ] 4   5. Taking care of personal grooming such as brushing teeth? [ ] 1   [ ] 2   [X] 3   [ ] 4   6. Eating meals? [ ] 1   [ ] 2   [ ] 3   [X] 4   © , Trustees of [address] [place] [address], under license to [employer]. All rights reserved    Score:  Initial: 19 Most Recent: X (Date: -- )     Interpretation of Tool:  Represents activities that are increasingly more difficult (i.e. Bed mobility, Transfers, Gait).        Score 24 23 22-20 19-15 14-10 9-7 6       Modifier CH CI CJ CK CL CM CN         · Self Care:               - CURRENT STATUS:    CK - 40%-59% impaired, limited or restricted               - GOAL STATUS:           CI - 1%-19% impaired, limited or restricted               - D/C STATUS:                       ---------------To be determined---------------  Payor: BLUE CROSS / [place]ENTIALS [place]LL[place] / Product Type: COLLIN /       Medical Necessity: · Patient demonstrates excellent rehab potential due to higher previous functional level. Reason for Services/Other Comments:  · Patient continues to require skilled intervention due to decreased independence with ADL/functional transfers. Use of outcome tool(s) and clinical judgement create a POC that gives a: LOW COMPLEXITY             TREATMENT:   (In addition to Assessment/Re-Assessment sessions the following treatments were rendered)      Pre-treatment Symptoms/Complaints:    Pain: Initial:   Pain Intensity 1: 5  Pain Location 1: Knee  Pain Orientation 1: Right  Pain Intervention(s) 1: Position  Post Session:  5/10      Assessment/Reassessment only, no treatment provided today     Braces/Orthotics/Lines/Etc:   · O2 Device: Room air  Treatment/Session Assessment:    · Response to Treatment:  Evaluation only. · Interdisciplinary Collaboration:  · Occupational Therapist  · Registered Nurse  · After treatment position/precautions:  · Up in chair  · Bed/Chair-wheels locked  · Call light within reach  · RN notified  · Family at bedside  · Compliance with Program/Exercises: Will assess as treatment progresses. · Recommendations/Intent for next treatment session: \"Next visit will focus on advancements to more challenging activities and reduction in assistance provided\".   Total Treatment Duration:  OT Patient Time In/Time Out  Time In: 1030  Time Out: [address] [facility], OT

## (undated) DEVICE — Device

## (undated) DEVICE — INTENDED FOR TISSUE SEPARATION, AND OTHER PROCEDURES THAT REQUIRE A SHARP SURGICAL BLADE TO PUNCTURE OR CUT.: Brand: BARD-PARKER SAFETY BLADES SIZE 10, STERILE

## (undated) DEVICE — BASIC SINGLE BASIN-LF: Brand: MEDLINE INDUSTRIES, INC.

## (undated) DEVICE — 60 ML SYRINGE LUER-LOCK TIP: Brand: MONOJECT

## (undated) DEVICE — NDL SPNE QNCKE 18GX3.5IN LF --

## (undated) DEVICE — REM POLYHESIVE ADULT PATIENT RETURN ELECTRODE: Brand: VALLEYLAB

## (undated) DEVICE — AMD ANTIMICROBIAL GAUZE SPONGES,12 PLY USP TYPE VII, 0.2% POLYHEXAMETHYLENE BIGUANIDE HCI (PHMB): Brand: CURITY

## (undated) DEVICE — DUAL CUT SAGITTAL BLADE

## (undated) DEVICE — MEDI-VAC YANKAUER SUCTION HANDLE W/BULBOUS TIP: Brand: CARDINAL HEALTH

## (undated) DEVICE — 2108 SERIES SAGITTAL BLADE, GROUND (18.5 X 1.32 X 86.0MM)

## (undated) DEVICE — DRAPE TBL W72XH34IN D30IN SGL PC DISPOSABLE

## (undated) DEVICE — FAN SPRAY KIT: Brand: PULSAVAC®

## (undated) DEVICE — (D)PREP SKN CHLRAPRP APPL 26ML -- CONVERT TO ITEM 371833

## (undated) DEVICE — 3M™ COBAN™ NL STERILE NON-LATEX SELF-ADHERENT WRAP, 2086S, 6 IN X 5 YD (15 CM X 4,5 M), 12 ROLLS/CASE: Brand: 3M™ COBAN™

## (undated) DEVICE — DISPOSABLE TOURNIQUET CUFF SINGLE BLADDER, DUAL PORT AND QUICK CONNECT CONNECTOR: Brand: COLOR CUFF

## (undated) DEVICE — 2000CC GUARDIAN II: Brand: GUARDIAN

## (undated) DEVICE — SUTURE VCRL SZ 2-0 L27IN ABSRB UD L36MM CP-1 1/2 CIR REV J266H

## (undated) DEVICE — RECIPROCATING BLADE, DOUBLE SIDED, OFFSET  (70.0 X 0.64 X 12.6MM)

## (undated) DEVICE — SOLUTION IRRIG 3000ML 0.9% SOD CHL FLX CONT 0797208] ICU MEDICAL INC]

## (undated) DEVICE — DRAPE,U/SHT,SPLIT,FILM,60X84,STERILE: Brand: MEDLINE

## (undated) DEVICE — DRESSING,GAUZE,XEROFORM,CURAD,1"X8",ST: Brand: CURAD

## (undated) DEVICE — SUTURE VCRL SZ 1 L27IN ABSRB UD L36MM CP-1 1/2 CIR REV CUT J268H

## (undated) DEVICE — PACK PROCEDURE SURG TOT KNEE

## (undated) DEVICE — INTENDED FOR TISSUE SEPARATION, AND OTHER PROCEDURES THAT REQUIRE A SHARP SURGICAL BLADE TO PUNCTURE OR CUT.: Brand: BARD-PARKER SAFETY BLADES SIZE 15, STERILE

## (undated) DEVICE — SLIM BODY SKIN STAPLER: Brand: APPOSE ULC

## (undated) DEVICE — BUTTON SWITCH PENCIL BLADE ELECTRODE, HOLSTER: Brand: EDGE

## (undated) DEVICE — T4 HOOD

## (undated) DEVICE — 3000CC GUARDIAN II: Brand: GUARDIAN